# Patient Record
Sex: MALE | Race: WHITE | Employment: OTHER | URBAN - NONMETROPOLITAN AREA
[De-identification: names, ages, dates, MRNs, and addresses within clinical notes are randomized per-mention and may not be internally consistent; named-entity substitution may affect disease eponyms.]

---

## 2017-05-01 DIAGNOSIS — G70.00 OCULAR MYASTHENIA GRAVIS (HCC): ICD-10-CM

## 2017-05-01 RX ORDER — PREDNISONE 1 MG/1
1 TABLET ORAL DAILY
Qty: 30 TABLET | Refills: 3 | Status: SHIPPED | OUTPATIENT
Start: 2017-05-01 | End: 2017-09-05 | Stop reason: SDUPTHER

## 2017-05-31 ENCOUNTER — INITIAL CONSULT (OUTPATIENT)
Dept: PULMONOLOGY | Age: 67
End: 2017-05-31

## 2017-05-31 VITALS
OXYGEN SATURATION: 97 % | DIASTOLIC BLOOD PRESSURE: 78 MMHG | HEIGHT: 70 IN | HEART RATE: 73 BPM | SYSTOLIC BLOOD PRESSURE: 128 MMHG | BODY MASS INDEX: 28.2 KG/M2 | WEIGHT: 197 LBS

## 2017-05-31 DIAGNOSIS — G47.33 OSA (OBSTRUCTIVE SLEEP APNEA): Primary | ICD-10-CM

## 2017-05-31 DIAGNOSIS — J30.89 PERENNIAL ALLERGIC RHINITIS, UNSPECIFIED ALLERGIC RHINITIS TRIGGER: ICD-10-CM

## 2017-05-31 DIAGNOSIS — I25.10 CORONARY ARTERY DISEASE INVOLVING NATIVE CORONARY ARTERY OF NATIVE HEART WITHOUT ANGINA PECTORIS: ICD-10-CM

## 2017-05-31 PROCEDURE — G8427 DOCREV CUR MEDS BY ELIG CLIN: HCPCS | Performed by: INTERNAL MEDICINE

## 2017-05-31 PROCEDURE — 99203 OFFICE O/P NEW LOW 30 MIN: CPT | Performed by: INTERNAL MEDICINE

## 2017-05-31 PROCEDURE — 3017F COLORECTAL CA SCREEN DOC REV: CPT | Performed by: INTERNAL MEDICINE

## 2017-05-31 PROCEDURE — G8598 ASA/ANTIPLAT THER USED: HCPCS | Performed by: INTERNAL MEDICINE

## 2017-05-31 PROCEDURE — G8420 CALC BMI NORM PARAMETERS: HCPCS | Performed by: INTERNAL MEDICINE

## 2017-05-31 PROCEDURE — 1036F TOBACCO NON-USER: CPT | Performed by: INTERNAL MEDICINE

## 2017-05-31 PROCEDURE — 1123F ACP DISCUSS/DSCN MKR DOCD: CPT | Performed by: INTERNAL MEDICINE

## 2017-05-31 PROCEDURE — 4040F PNEUMOC VAC/ADMIN/RCVD: CPT | Performed by: INTERNAL MEDICINE

## 2017-05-31 RX ORDER — FLUTICASONE PROPIONATE 50 MCG
2 SPRAY, SUSPENSION (ML) NASAL DAILY
Qty: 1 BOTTLE | Refills: 6 | Status: SHIPPED | OUTPATIENT
Start: 2017-05-31 | End: 2019-11-06

## 2017-06-08 ENCOUNTER — OFFICE VISIT (OUTPATIENT)
Dept: PHYSICAL MEDICINE AND REHAB | Age: 67
End: 2017-06-08

## 2017-06-08 VITALS
DIASTOLIC BLOOD PRESSURE: 72 MMHG | BODY MASS INDEX: 29.47 KG/M2 | HEIGHT: 69 IN | SYSTOLIC BLOOD PRESSURE: 127 MMHG | HEART RATE: 77 BPM | WEIGHT: 199 LBS

## 2017-06-08 DIAGNOSIS — G70.00 OCULAR MYASTHENIA GRAVIS (HCC): Primary | ICD-10-CM

## 2017-06-08 PROCEDURE — 3017F COLORECTAL CA SCREEN DOC REV: CPT | Performed by: PSYCHIATRY & NEUROLOGY

## 2017-06-08 PROCEDURE — G8427 DOCREV CUR MEDS BY ELIG CLIN: HCPCS | Performed by: PSYCHIATRY & NEUROLOGY

## 2017-06-08 PROCEDURE — G8419 CALC BMI OUT NRM PARAM NOF/U: HCPCS | Performed by: PSYCHIATRY & NEUROLOGY

## 2017-06-08 PROCEDURE — 1123F ACP DISCUSS/DSCN MKR DOCD: CPT | Performed by: PSYCHIATRY & NEUROLOGY

## 2017-06-08 PROCEDURE — 1036F TOBACCO NON-USER: CPT | Performed by: PSYCHIATRY & NEUROLOGY

## 2017-06-08 PROCEDURE — 4040F PNEUMOC VAC/ADMIN/RCVD: CPT | Performed by: PSYCHIATRY & NEUROLOGY

## 2017-06-08 PROCEDURE — G8598 ASA/ANTIPLAT THER USED: HCPCS | Performed by: PSYCHIATRY & NEUROLOGY

## 2017-06-08 PROCEDURE — 99213 OFFICE O/P EST LOW 20 MIN: CPT | Performed by: PSYCHIATRY & NEUROLOGY

## 2017-07-07 ENCOUNTER — TELEPHONE (OUTPATIENT)
Dept: SLEEP CENTER | Age: 67
End: 2017-07-07

## 2017-07-20 ENCOUNTER — TELEPHONE (OUTPATIENT)
Dept: PULMONOLOGY | Age: 67
End: 2017-07-20

## 2017-09-05 DIAGNOSIS — G70.00 OCULAR MYASTHENIA GRAVIS (HCC): ICD-10-CM

## 2017-09-06 RX ORDER — PREDNISONE 1 MG/1
1 TABLET ORAL DAILY
Qty: 30 TABLET | Refills: 3 | Status: SHIPPED | OUTPATIENT
Start: 2017-09-06 | End: 2017-12-11 | Stop reason: SDUPTHER

## 2017-09-13 ENCOUNTER — OFFICE VISIT (OUTPATIENT)
Dept: PULMONOLOGY | Age: 67
End: 2017-09-13
Payer: OTHER GOVERNMENT

## 2017-09-13 VITALS
DIASTOLIC BLOOD PRESSURE: 80 MMHG | HEART RATE: 72 BPM | WEIGHT: 194.2 LBS | SYSTOLIC BLOOD PRESSURE: 136 MMHG | HEIGHT: 70 IN | BODY MASS INDEX: 27.8 KG/M2 | OXYGEN SATURATION: 97 %

## 2017-09-13 DIAGNOSIS — G47.33 OSA ON CPAP: ICD-10-CM

## 2017-09-13 DIAGNOSIS — Z99.89 OSA ON CPAP: ICD-10-CM

## 2017-09-13 PROCEDURE — 99213 OFFICE O/P EST LOW 20 MIN: CPT | Performed by: PHYSICIAN ASSISTANT

## 2017-09-21 ENCOUNTER — TELEPHONE (OUTPATIENT)
Dept: ADMINISTRATIVE | Age: 67
End: 2017-09-21

## 2017-09-21 NOTE — TELEPHONE ENCOUNTER
Telephone Outcome: Other - pt is in [place], is interested in scheduling a Medicare AWV, will call when he returns.

## 2017-11-13 ENCOUNTER — TELEPHONE (OUTPATIENT)
Dept: FAMILY MEDICINE CLINIC | Age: 67
End: 2017-11-13

## 2017-11-13 DIAGNOSIS — I25.10 CORONARY ARTERY DISEASE INVOLVING NATIVE CORONARY ARTERY OF NATIVE HEART WITHOUT ANGINA PECTORIS: Primary | ICD-10-CM

## 2017-11-13 DIAGNOSIS — E78.00 PURE HYPERCHOLESTEROLEMIA: ICD-10-CM

## 2017-11-13 DIAGNOSIS — R79.89 LOW TESTOSTERONE: ICD-10-CM

## 2017-11-13 DIAGNOSIS — N40.0 BENIGN PROSTATIC HYPERPLASIA, UNSPECIFIED WHETHER LOWER URINARY TRACT SYMPTOMS PRESENT: ICD-10-CM

## 2017-11-13 NOTE — TELEPHONE ENCOUNTER
Patient is asking for a order for lab work so he can get it done before he comes in for a December visit.  He would like to stop by and  the order once it is ready please advise to patient at [phone]

## 2017-11-16 ENCOUNTER — TELEPHONE (OUTPATIENT)
Dept: FAMILY MEDICINE CLINIC | Age: 67
End: 2017-11-16

## 2017-11-16 LAB
ABSOLUTE BASO #: 0.1 K/UL (ref 0–0.1)
ABSOLUTE EOS #: 0.2 K/UL (ref 0.1–0.4)
ABSOLUTE LYMPH #: 2.1 K/UL (ref 0.8–5.2)
ABSOLUTE MONO #: 0.8 K/UL (ref 0.1–0.9)
ABSOLUTE NEUT #: 3.8 K/UL (ref 1.3–9.1)
ALBUMIN SERPL-MCNC: 4.3 G/DL (ref 3.2–5.3)
ALK PHOSPHATASE: 56 IU/L (ref 35–121)
ALT SERPL-CCNC: 15 IU/L (ref 5–59)
ANION GAP SERPL CALCULATED.3IONS-SCNC: 15 MMOL/L
AST SERPL-CCNC: 18 IU/L (ref 10–42)
BASOPHILS RELATIVE PERCENT: 1.2 %
BILIRUB SERPL-MCNC: 0.7 MG/DL (ref 0.2–1.3)
BUN BLDV-MCNC: 10 MG/DL (ref 10–20)
CALCIUM SERPL-MCNC: 9.6 MG/DL (ref 8.7–10.8)
CHLORIDE BLD-SCNC: 102 MMOL/L (ref 95–111)
CHOLESTEROL/HDL RATIO: 2.5
CHOLESTEROL: 199 MG/DL
CO2: 30 MMOL/L (ref 21–32)
CREAT SERPL-MCNC: 0.7 MG/DL (ref 0.5–1.3)
EGFR AFRICAN AMERICAN: 135
EGFR IF NONAFRICAN AMERICAN: 112
EOSINOPHILS RELATIVE PERCENT: 2.3 %
GLUCOSE: 99 MG/DL (ref 70–100)
HCT VFR BLD CALC: 49.3 % (ref 41.4–51)
HDLC SERPL-MCNC: 79 MG/DL (ref 40–60)
HEMOGLOBIN: 16.4 G/DL (ref 13.8–17)
LDL CHOLESTEROL CALCULATED: 93 MG/DL
LDL/HDL RATIO: 1.2
LYMPHOCYTE %: 30.3 %
MCH RBC QN AUTO: 29.3 PG (ref 27–34)
MCHC RBC AUTO-ENTMCNC: 33.3 G/DL (ref 31–36)
MCV RBC AUTO: 88.2 FL (ref 80–100)
MONOCYTES # BLD: 11.1 %
NEUTROPHILS RELATIVE PERCENT: 54.2 %
PDW BLD-RTO: 13.1 % (ref 10.8–14.8)
PLATELETS: 229 K/UL (ref 150–450)
POTASSIUM SERPL-SCNC: 4.8 MMOL/L (ref 3.5–5.4)
PSA, ULTRASENSITIVE: 1.62 NG/ML
RBC: 5.59 M/UL (ref 4–5.5)
SODIUM BLD-SCNC: 142 MMOL/L (ref 134–147)
TOTAL PROTEIN: 6.6 G/DL (ref 5.8–8)
TRIGL SERPL-MCNC: 135 MG/DL
VLDLC SERPL CALC-MCNC: 27 MG/DL
WBC: 6.9 K/UL (ref 3.7–10.8)

## 2017-11-16 NOTE — TELEPHONE ENCOUNTER
----- Message from Jasmyne Magdaleno DO sent at 11/16/2017  8:35 AM EST -----  Labs look good, will discuss further at next visit.

## 2017-12-11 ENCOUNTER — OFFICE VISIT (OUTPATIENT)
Dept: NEUROLOGY | Age: 67
End: 2017-12-11
Payer: MEDICARE

## 2017-12-11 VITALS
HEART RATE: 89 BPM | BODY MASS INDEX: 29.47 KG/M2 | HEIGHT: 69 IN | DIASTOLIC BLOOD PRESSURE: 84 MMHG | WEIGHT: 199 LBS | SYSTOLIC BLOOD PRESSURE: 128 MMHG

## 2017-12-11 DIAGNOSIS — G70.00 OCULAR MYASTHENIA GRAVIS (HCC): Primary | ICD-10-CM

## 2017-12-11 PROCEDURE — 4040F PNEUMOC VAC/ADMIN/RCVD: CPT | Performed by: PSYCHIATRY & NEUROLOGY

## 2017-12-11 PROCEDURE — G8482 FLU IMMUNIZE ORDER/ADMIN: HCPCS | Performed by: PSYCHIATRY & NEUROLOGY

## 2017-12-11 PROCEDURE — G8427 DOCREV CUR MEDS BY ELIG CLIN: HCPCS | Performed by: PSYCHIATRY & NEUROLOGY

## 2017-12-11 PROCEDURE — 1123F ACP DISCUSS/DSCN MKR DOCD: CPT | Performed by: PSYCHIATRY & NEUROLOGY

## 2017-12-11 PROCEDURE — 99213 OFFICE O/P EST LOW 20 MIN: CPT | Performed by: PSYCHIATRY & NEUROLOGY

## 2017-12-11 PROCEDURE — 1036F TOBACCO NON-USER: CPT | Performed by: PSYCHIATRY & NEUROLOGY

## 2017-12-11 PROCEDURE — 3017F COLORECTAL CA SCREEN DOC REV: CPT | Performed by: PSYCHIATRY & NEUROLOGY

## 2017-12-11 PROCEDURE — G8419 CALC BMI OUT NRM PARAM NOF/U: HCPCS | Performed by: PSYCHIATRY & NEUROLOGY

## 2017-12-11 PROCEDURE — G8598 ASA/ANTIPLAT THER USED: HCPCS | Performed by: PSYCHIATRY & NEUROLOGY

## 2017-12-11 RX ORDER — PREDNISONE 1 MG/1
1 TABLET ORAL DAILY
Qty: 30 TABLET | Refills: 3 | Status: SHIPPED | OUTPATIENT
Start: 2017-12-11 | End: 2018-05-14 | Stop reason: SDUPTHER

## 2017-12-11 NOTE — PROGRESS NOTES
he is doing well on it. After detailed discussion with patient we agreed on the following plan. Plan:    1. Continue Prednisone to 1 mg daily. Refills given  2. Continue over the counter Pepcid  3. Take Calcium and Vitamin D daily while taking Prednisone  4. Follow up in 6 months or sooner if needed. 5. Call if any questions or concerns. Please call if any questions.      Sharmaine Neil MD

## 2017-12-14 ENCOUNTER — OFFICE VISIT (OUTPATIENT)
Dept: FAMILY MEDICINE CLINIC | Age: 67
End: 2017-12-14
Payer: MEDICARE

## 2017-12-14 VITALS
RESPIRATION RATE: 14 BRPM | HEART RATE: 69 BPM | TEMPERATURE: 98 F | WEIGHT: 198.8 LBS | SYSTOLIC BLOOD PRESSURE: 102 MMHG | HEIGHT: 69 IN | BODY MASS INDEX: 29.44 KG/M2 | DIASTOLIC BLOOD PRESSURE: 76 MMHG

## 2017-12-14 DIAGNOSIS — Z00.00 ROUTINE GENERAL MEDICAL EXAMINATION AT A HEALTH CARE FACILITY: Primary | ICD-10-CM

## 2017-12-14 PROCEDURE — G0439 PPPS, SUBSEQ VISIT: HCPCS | Performed by: FAMILY MEDICINE

## 2017-12-14 PROCEDURE — G8598 ASA/ANTIPLAT THER USED: HCPCS | Performed by: FAMILY MEDICINE

## 2017-12-14 ASSESSMENT — PATIENT HEALTH QUESTIONNAIRE - PHQ9
2. FEELING DOWN, DEPRESSED OR HOPELESS: 0
SUM OF ALL RESPONSES TO PHQ QUESTIONS 1-9: 0
SUM OF ALL RESPONSES TO PHQ9 QUESTIONS 1 & 2: 0
1. LITTLE INTEREST OR PLEASURE IN DOING THINGS: 0

## 2017-12-14 NOTE — PATIENT INSTRUCTIONS
You may receive a survey about your visit with us today. The feedback from our patients helps us identify what is working well and where the service to all patients can be enhanced. Thank you! Personalized Preventive Plan for Irvin Duarte - 12/14/2017  Medicare offers a range of preventive health benefits. Some of the tests and screenings are paid in full while other may be subject to a deductible, co-insurance, and/or copay. Some of these benefits include a comprehensive review of your medical history including lifestyle, illnesses that may run in your family, and various assessments and screenings as appropriate. After reviewing your medical record and screening and assessments performed today your provider may have ordered immunizations, labs, imaging, and/or referrals for you. A list of these orders (if applicable) as well as your Preventive Care list are included within your After Visit Summary for your review. Other Preventive Recommendations:    · A preventive eye exam performed by an eye specialist is recommended every 1-2 years to screen for glaucoma; cataracts, macular degeneration, and other eye disorders. · A preventive dental visit is recommended every 6 months. · Try to get at least 150 minutes of exercise per week or 10,000 steps per day on a pedometer . · Order or download the FREE \"Exercise & Physical Activity: Your Everyday Guide\" from [employer]. Call 9-205.803.3232 or search [employer] online. · You need 6977-0039 mg of calcium and 7242-7855 IU of vitamin D per day. It is possible to meet your calcium requirement with diet alone, but a vitamin D supplement is usually necessary to meet this goal.  · When exposed to the sun, use a sunscreen that protects against both UVA and UVB radiation with an SPF of 30 or greater. Reapply every 2 to 3 hours or after sweating, drying off with a towel, or swimming.   · Always wear a seat belt when

## 2018-05-14 DIAGNOSIS — G70.00 OCULAR MYASTHENIA GRAVIS (HCC): ICD-10-CM

## 2018-05-14 RX ORDER — PREDNISONE 1 MG/1
1 TABLET ORAL DAILY
Qty: 90 TABLET | Refills: 1 | Status: SHIPPED | OUTPATIENT
Start: 2018-05-14 | End: 2018-06-04 | Stop reason: SDUPTHER

## 2018-06-04 ENCOUNTER — OFFICE VISIT (OUTPATIENT)
Dept: NEUROLOGY | Age: 68
End: 2018-06-04
Payer: MEDICARE

## 2018-06-04 VITALS
BODY MASS INDEX: 30.07 KG/M2 | HEART RATE: 68 BPM | HEIGHT: 69 IN | DIASTOLIC BLOOD PRESSURE: 76 MMHG | SYSTOLIC BLOOD PRESSURE: 120 MMHG | WEIGHT: 203 LBS

## 2018-06-04 DIAGNOSIS — G70.00 OCULAR MYASTHENIA GRAVIS (HCC): Primary | ICD-10-CM

## 2018-06-04 PROCEDURE — 4040F PNEUMOC VAC/ADMIN/RCVD: CPT | Performed by: PSYCHIATRY & NEUROLOGY

## 2018-06-04 PROCEDURE — G8417 CALC BMI ABV UP PARAM F/U: HCPCS | Performed by: PSYCHIATRY & NEUROLOGY

## 2018-06-04 PROCEDURE — 3017F COLORECTAL CA SCREEN DOC REV: CPT | Performed by: PSYCHIATRY & NEUROLOGY

## 2018-06-04 PROCEDURE — 1036F TOBACCO NON-USER: CPT | Performed by: PSYCHIATRY & NEUROLOGY

## 2018-06-04 PROCEDURE — G8598 ASA/ANTIPLAT THER USED: HCPCS | Performed by: PSYCHIATRY & NEUROLOGY

## 2018-06-04 PROCEDURE — 99213 OFFICE O/P EST LOW 20 MIN: CPT | Performed by: PSYCHIATRY & NEUROLOGY

## 2018-06-04 PROCEDURE — 1123F ACP DISCUSS/DSCN MKR DOCD: CPT | Performed by: PSYCHIATRY & NEUROLOGY

## 2018-06-04 PROCEDURE — G8427 DOCREV CUR MEDS BY ELIG CLIN: HCPCS | Performed by: PSYCHIATRY & NEUROLOGY

## 2018-06-04 RX ORDER — PREDNISONE 1 MG/1
1 TABLET ORAL DAILY
Qty: 90 TABLET | Refills: 2 | Status: SHIPPED | OUTPATIENT
Start: 2018-06-04 | End: 2018-12-10 | Stop reason: SDUPTHER

## 2018-06-06 ENCOUNTER — TELEPHONE (OUTPATIENT)
Dept: FAMILY MEDICINE CLINIC | Age: 68
End: 2018-06-06

## 2018-12-10 ENCOUNTER — OFFICE VISIT (OUTPATIENT)
Dept: NEUROLOGY | Age: 68
End: 2018-12-10
Payer: MEDICARE

## 2018-12-10 VITALS
WEIGHT: 197 LBS | HEART RATE: 66 BPM | HEIGHT: 69 IN | DIASTOLIC BLOOD PRESSURE: 60 MMHG | BODY MASS INDEX: 29.18 KG/M2 | SYSTOLIC BLOOD PRESSURE: 132 MMHG

## 2018-12-10 DIAGNOSIS — G70.00 OCULAR MYASTHENIA GRAVIS (HCC): Primary | ICD-10-CM

## 2018-12-10 PROCEDURE — 99213 OFFICE O/P EST LOW 20 MIN: CPT | Performed by: PSYCHIATRY & NEUROLOGY

## 2018-12-10 PROCEDURE — 3017F COLORECTAL CA SCREEN DOC REV: CPT | Performed by: PSYCHIATRY & NEUROLOGY

## 2018-12-10 PROCEDURE — 1036F TOBACCO NON-USER: CPT | Performed by: PSYCHIATRY & NEUROLOGY

## 2018-12-10 PROCEDURE — G8417 CALC BMI ABV UP PARAM F/U: HCPCS | Performed by: PSYCHIATRY & NEUROLOGY

## 2018-12-10 PROCEDURE — G8484 FLU IMMUNIZE NO ADMIN: HCPCS | Performed by: PSYCHIATRY & NEUROLOGY

## 2018-12-10 PROCEDURE — 1123F ACP DISCUSS/DSCN MKR DOCD: CPT | Performed by: PSYCHIATRY & NEUROLOGY

## 2018-12-10 PROCEDURE — 4040F PNEUMOC VAC/ADMIN/RCVD: CPT | Performed by: PSYCHIATRY & NEUROLOGY

## 2018-12-10 PROCEDURE — G8427 DOCREV CUR MEDS BY ELIG CLIN: HCPCS | Performed by: PSYCHIATRY & NEUROLOGY

## 2018-12-10 PROCEDURE — 1101F PT FALLS ASSESS-DOCD LE1/YR: CPT | Performed by: PSYCHIATRY & NEUROLOGY

## 2018-12-10 PROCEDURE — G8598 ASA/ANTIPLAT THER USED: HCPCS | Performed by: PSYCHIATRY & NEUROLOGY

## 2018-12-10 RX ORDER — PREDNISONE 1 MG/1
1 TABLET ORAL DAILY
Qty: 90 TABLET | Refills: 2 | Status: SHIPPED | OUTPATIENT
Start: 2018-12-10 | End: 2019-12-10

## 2018-12-10 NOTE — PROGRESS NOTES
D, while on Prednisone. He is taking over the counter Pepcid. He states he is doing well on it. After detailed discussion with patient we agreed on the following plan. Plan:    1. Continue Prednisone to 1 mg daily. Refills given  2. Continue over the counter Pepcid  3. Take Calcium and Vitamin D daily while taking Prednisone  4. Follow up in 6 months or sooner if needed. 5. Call if any questions or concerns. Please call if any questions.      Saurabh Garg MD

## 2018-12-11 ENCOUNTER — TELEPHONE (OUTPATIENT)
Dept: FAMILY MEDICINE CLINIC | Age: 68
End: 2018-12-11

## 2018-12-11 DIAGNOSIS — I25.10 CORONARY ARTERY DISEASE INVOLVING NATIVE CORONARY ARTERY OF NATIVE HEART WITHOUT ANGINA PECTORIS: ICD-10-CM

## 2018-12-11 DIAGNOSIS — N40.0 BENIGN PROSTATIC HYPERPLASIA, UNSPECIFIED WHETHER LOWER URINARY TRACT SYMPTOMS PRESENT: ICD-10-CM

## 2018-12-11 DIAGNOSIS — R79.89 LOW TESTOSTERONE: Primary | ICD-10-CM

## 2018-12-11 DIAGNOSIS — R33.9 URINARY RETENTION: ICD-10-CM

## 2018-12-13 ENCOUNTER — NURSE ONLY (OUTPATIENT)
Dept: LAB | Age: 68
End: 2018-12-13

## 2018-12-13 DIAGNOSIS — I25.10 CORONARY ARTERY DISEASE INVOLVING NATIVE CORONARY ARTERY OF NATIVE HEART WITHOUT ANGINA PECTORIS: ICD-10-CM

## 2018-12-13 DIAGNOSIS — R33.9 URINARY RETENTION: ICD-10-CM

## 2018-12-13 DIAGNOSIS — N40.0 BENIGN PROSTATIC HYPERPLASIA, UNSPECIFIED WHETHER LOWER URINARY TRACT SYMPTOMS PRESENT: ICD-10-CM

## 2018-12-13 DIAGNOSIS — R79.89 LOW TESTOSTERONE: ICD-10-CM

## 2018-12-13 LAB
ALBUMIN SERPL-MCNC: 4.2 G/DL (ref 3.5–5.1)
ALP BLD-CCNC: 59 U/L (ref 38–126)
ALT SERPL-CCNC: 12 U/L (ref 11–66)
ANION GAP SERPL CALCULATED.3IONS-SCNC: 11 MEQ/L (ref 8–16)
AST SERPL-CCNC: 19 U/L (ref 5–40)
BASOPHILS # BLD: 1.1 %
BASOPHILS ABSOLUTE: 0.1 THOU/MM3 (ref 0–0.1)
BILIRUB SERPL-MCNC: 0.7 MG/DL (ref 0.3–1.2)
BUN BLDV-MCNC: 13 MG/DL (ref 7–22)
CALCIUM SERPL-MCNC: 9.6 MG/DL (ref 8.5–10.5)
CHLORIDE BLD-SCNC: 102 MEQ/L (ref 98–111)
CHOLESTEROL, TOTAL: 164 MG/DL (ref 100–199)
CO2: 27 MEQ/L (ref 23–33)
CREAT SERPL-MCNC: 0.7 MG/DL (ref 0.4–1.2)
EOSINOPHIL # BLD: 3 %
EOSINOPHILS ABSOLUTE: 0.2 THOU/MM3 (ref 0–0.4)
ERYTHROCYTE [DISTWIDTH] IN BLOOD BY AUTOMATED COUNT: 13.2 % (ref 11.5–14.5)
ERYTHROCYTE [DISTWIDTH] IN BLOOD BY AUTOMATED COUNT: 43.8 FL (ref 35–45)
GFR SERPL CREATININE-BSD FRML MDRD: > 90 ML/MIN/1.73M2
GLUCOSE BLD-MCNC: 103 MG/DL (ref 70–108)
HCT VFR BLD CALC: 48.5 % (ref 42–52)
HDLC SERPL-MCNC: 62 MG/DL
HEMOGLOBIN: 15.9 GM/DL (ref 14–18)
IMMATURE GRANS (ABS): 0.02 THOU/MM3 (ref 0–0.07)
IMMATURE GRANULOCYTES: 0.4 %
LDL CHOLESTEROL CALCULATED: 80 MG/DL
LYMPHOCYTES # BLD: 31.9 %
LYMPHOCYTES ABSOLUTE: 1.8 THOU/MM3 (ref 1–4.8)
MCH RBC QN AUTO: 29.9 PG (ref 26–33)
MCHC RBC AUTO-ENTMCNC: 32.8 GM/DL (ref 32.2–35.5)
MCV RBC AUTO: 91.3 FL (ref 80–94)
MONOCYTES # BLD: 11.3 %
MONOCYTES ABSOLUTE: 0.6 THOU/MM3 (ref 0.4–1.3)
NUCLEATED RED BLOOD CELLS: 0 /100 WBC
PLATELET # BLD: 233 THOU/MM3 (ref 130–400)
PMV BLD AUTO: 10.2 FL (ref 9.4–12.4)
POTASSIUM SERPL-SCNC: 4.4 MEQ/L (ref 3.5–5.2)
PROSTATE SPECIFIC ANTIGEN: 1.86 NG/ML (ref 0–1)
RBC # BLD: 5.31 MILL/MM3 (ref 4.7–6.1)
SEG NEUTROPHILS: 52.3 %
SEGMENTED NEUTROPHILS ABSOLUTE COUNT: 3 THOU/MM3 (ref 1.8–7.7)
SODIUM BLD-SCNC: 140 MEQ/L (ref 135–145)
TOTAL PROTEIN: 6.9 G/DL (ref 6.1–8)
TRIGL SERPL-MCNC: 112 MG/DL (ref 0–199)
WBC # BLD: 5.7 THOU/MM3 (ref 4.8–10.8)

## 2018-12-26 ENCOUNTER — OFFICE VISIT (OUTPATIENT)
Dept: FAMILY MEDICINE CLINIC | Age: 68
End: 2018-12-26
Payer: MEDICARE

## 2018-12-26 VITALS
HEART RATE: 76 BPM | SYSTOLIC BLOOD PRESSURE: 136 MMHG | RESPIRATION RATE: 12 BRPM | DIASTOLIC BLOOD PRESSURE: 88 MMHG | WEIGHT: 198.8 LBS | HEIGHT: 70 IN | TEMPERATURE: 98 F | BODY MASS INDEX: 28.46 KG/M2

## 2018-12-26 DIAGNOSIS — G70.00 OCULAR MYASTHENIA GRAVIS (HCC): ICD-10-CM

## 2018-12-26 DIAGNOSIS — E78.5 HYPERLIPIDEMIA, UNSPECIFIED HYPERLIPIDEMIA TYPE: ICD-10-CM

## 2018-12-26 DIAGNOSIS — N40.0 BENIGN PROSTATIC HYPERPLASIA, UNSPECIFIED WHETHER LOWER URINARY TRACT SYMPTOMS PRESENT: Primary | ICD-10-CM

## 2018-12-26 PROCEDURE — G8598 ASA/ANTIPLAT THER USED: HCPCS | Performed by: FAMILY MEDICINE

## 2018-12-26 PROCEDURE — 1101F PT FALLS ASSESS-DOCD LE1/YR: CPT | Performed by: FAMILY MEDICINE

## 2018-12-26 PROCEDURE — G8482 FLU IMMUNIZE ORDER/ADMIN: HCPCS | Performed by: FAMILY MEDICINE

## 2018-12-26 PROCEDURE — 3017F COLORECTAL CA SCREEN DOC REV: CPT | Performed by: FAMILY MEDICINE

## 2018-12-26 PROCEDURE — 1036F TOBACCO NON-USER: CPT | Performed by: FAMILY MEDICINE

## 2018-12-26 PROCEDURE — 99214 OFFICE O/P EST MOD 30 MIN: CPT | Performed by: FAMILY MEDICINE

## 2018-12-26 PROCEDURE — 1123F ACP DISCUSS/DSCN MKR DOCD: CPT | Performed by: FAMILY MEDICINE

## 2018-12-26 PROCEDURE — G8417 CALC BMI ABV UP PARAM F/U: HCPCS | Performed by: FAMILY MEDICINE

## 2018-12-26 PROCEDURE — 4040F PNEUMOC VAC/ADMIN/RCVD: CPT | Performed by: FAMILY MEDICINE

## 2018-12-26 PROCEDURE — G8427 DOCREV CUR MEDS BY ELIG CLIN: HCPCS | Performed by: FAMILY MEDICINE

## 2018-12-26 ASSESSMENT — PATIENT HEALTH QUESTIONNAIRE - PHQ9
SUM OF ALL RESPONSES TO PHQ QUESTIONS 1-9: 0
2. FEELING DOWN, DEPRESSED OR HOPELESS: 0
1. LITTLE INTEREST OR PLEASURE IN DOING THINGS: 0
SUM OF ALL RESPONSES TO PHQ9 QUESTIONS 1 & 2: 0
SUM OF ALL RESPONSES TO PHQ QUESTIONS 1-9: 0

## 2018-12-26 NOTE — PROGRESS NOTES
Francisca Leung is a 77 y.o. male that presents for     Chief Complaint   Patient presents with    Annual Exam    Follow Up After Procedure     wants to go over lab results        /88   Pulse 76   Temp 98 °F (36.7 °C) (Oral)   Resp 12   Ht 5' 9.5\" (1.765 m)   Wt 198 lb 12.8 oz (90.2 kg)   BMI 28.94 kg/m²       HPI:    Dyslipidemia    Current Medication regimen - zocor 40  Tolerating medications well? Yes  Personal History of CAD? Yes   Hx of CAD in first degree relatives? No  Current smoker? No  History of HTN? No  History of DM? No    Shortness of breath or chest pain? No  Neurologic changes? No  Extremity edema? No    Lab Results   Component Value Date    LDLCALC 80 12/13/2018     BP Readings from Last 3 Encounters:   12/26/18 136/88   12/10/18 132/60   06/04/18 120/76     Wt Readings from Last 3 Encounters:   12/26/18 198 lb 12.8 oz (90.2 kg)   12/10/18 197 lb (89.4 kg)   06/04/18 203 lb (92.1 kg)     BPH:  Recent PSA wnl. He is on Flomax. Notes a little hesitancy. MG:  Taking prednisone qod. STates that he is trying to wean off of the prednisone. Sx generally have been well controlled.       Health Maintenance   Topic Date Due    AAA screen  1950    Shingles Vaccine (1 of 2 - 2 Dose Series) 07/23/1998    DTaP/Tdap/Td vaccine (1 - Tdap) 01/02/1999    Colon cancer screen colonoscopy  09/29/2021    Diabetes screen  12/13/2021    Lipid screen  12/13/2023    Flu vaccine  Completed    Pneumococcal low/med risk  Completed    Hepatitis C screen  Completed       Past Medical History:   Diagnosis Date    Arthritis     BPH (benign prostatic hyperplasia)     CAD (coronary artery disease)     Cataract     Diverticulitis     Diverticulosis     sigmoid    Glaucoma     History of colonic polyps     History of Helicobacter pylori infection     1990    Internal hemorrhoid     Ocular myasthenia gravis (HCC)     OS    Tattoo     Tinnitus     Unspecified sleep apnea        Past Surgical History:   Procedure Laterality Date    BACK SURGERY      betzaida placement     CATARACT REMOVAL WITH IMPLANT Bilateral 2005    COLONOSCOPY  2013, 2016    EYE SURGERY      MENISCECTOMY      UPPER GASTROINTESTINAL ENDOSCOPY  1990    Unable to obtain       Social History   Substance Use Topics    Smoking status: Former Smoker     Packs/day: 1.00     Years: 15.00     Types: Cigarettes     Quit date: 1/1/1979    Smokeless tobacco: Never Used    Alcohol use 0.0 oz/week      Comment: socially- rarely       Family History   Problem Relation Age of Onset    High Blood Pressure Mother     Diabetes Father     Cancer Sister 72        Mesothelioma    Lupus Sister     Colon Cancer Neg Hx     Inflam Bowel Dis Neg Hx          I have reviewed the patient's past medical history, past surgical history, allergies, medications, social and family history and I have made updates where appropriate.     Review of Systems        PHYSICAL EXAM:  /88   Pulse 76   Temp 98 °F (36.7 °C) (Oral)   Resp 12   Ht 5' 9.5\" (1.765 m)   Wt 198 lb 12.8 oz (90.2 kg)   BMI 28.94 kg/m²     General Appearance: well developed and well- nourished, in no acute distress  Head: normocephalic and atraumatic  ENT: external ear and ear canal normal bilaterally, nose without deformity, nasal mucosa and turbinates normal without polyps, oropharynx normal, dentition is normal for age, no lipor gum lesions noted  Neck: supple and non-tender without mass, no thyromegaly or thyroid nodules, no cervical lymphadenopathy  Pulmonary/Chest: clear to auscultation bilaterally- no wheezes, rales or rhonchi, normal air movement, no respiratory distress or retractions  Cardiovascular: normal rate, regular rhythm, normal S1 and S2, no murmurs, rubs, clicks, or gallops, distal pulses intact  Abdomen: soft, non-tender, non-distended, no rebound or guarding, no masses or hernias noted, no hepatospleenomegaly  Extremities: no cyanosis, clubbing or edema of

## 2019-11-06 ENCOUNTER — HOSPITAL ENCOUNTER (EMERGENCY)
Age: 69
Discharge: HOME | End: 2019-11-06
Attending: FAMILY MEDICINE
Payer: OTHER GOVERNMENT

## 2019-11-06 ENCOUNTER — APPOINTMENT (OUTPATIENT)
Dept: GENERAL RADIOLOGY | Age: 69
End: 2019-11-06
Payer: OTHER GOVERNMENT

## 2019-11-06 ENCOUNTER — APPOINTMENT (OUTPATIENT)
Dept: CT IMAGING | Age: 69
End: 2019-11-06
Payer: OTHER GOVERNMENT

## 2019-11-06 VITALS
SYSTOLIC BLOOD PRESSURE: 134 MMHG | DIASTOLIC BLOOD PRESSURE: 88 MMHG | TEMPERATURE: 98.2 F | OXYGEN SATURATION: 100 % | HEART RATE: 82 BPM | RESPIRATION RATE: 16 BRPM

## 2019-11-06 DIAGNOSIS — M54.2 NECK PAIN: Primary | ICD-10-CM

## 2019-11-06 LAB
ANION GAP SERPL CALCULATED.3IONS-SCNC: 14 MEQ/L (ref 8–16)
BASOPHILS # BLD: 0.6 %
BASOPHILS ABSOLUTE: 0.1 THOU/MM3 (ref 0–0.1)
BUN BLDV-MCNC: 11 MG/DL (ref 7–22)
C-REACTIVE PROTEIN: 3.81 MG/DL (ref 0–1)
CALCIUM SERPL-MCNC: 10 MG/DL (ref 8.5–10.5)
CHLORIDE BLD-SCNC: 102 MEQ/L (ref 98–111)
CO2: 24 MEQ/L (ref 23–33)
CREAT SERPL-MCNC: 0.7 MG/DL (ref 0.4–1.2)
EKG ATRIAL RATE: 87 BPM
EKG P AXIS: 49 DEGREES
EKG P-R INTERVAL: 172 MS
EKG Q-T INTERVAL: 366 MS
EKG QRS DURATION: 98 MS
EKG QTC CALCULATION (BAZETT): 440 MS
EKG R AXIS: 56 DEGREES
EKG T AXIS: 53 DEGREES
EKG VENTRICULAR RATE: 87 BPM
EOSINOPHIL # BLD: 1.1 %
EOSINOPHILS ABSOLUTE: 0.1 THOU/MM3 (ref 0–0.4)
ERYTHROCYTE [DISTWIDTH] IN BLOOD BY AUTOMATED COUNT: 13.3 % (ref 11.5–14.5)
ERYTHROCYTE [DISTWIDTH] IN BLOOD BY AUTOMATED COUNT: 43.3 FL (ref 35–45)
GFR SERPL CREATININE-BSD FRML MDRD: > 90 ML/MIN/1.73M2
GLUCOSE BLD-MCNC: 112 MG/DL (ref 70–108)
HCT VFR BLD CALC: 46.8 % (ref 42–52)
HEMOGLOBIN: 15.5 GM/DL (ref 14–18)
IMMATURE GRANS (ABS): 0.04 THOU/MM3 (ref 0–0.07)
IMMATURE GRANULOCYTES: 0.5 %
LYMPHOCYTES # BLD: 13.4 %
LYMPHOCYTES ABSOLUTE: 1.2 THOU/MM3 (ref 1–4.8)
MCH RBC QN AUTO: 29.6 PG (ref 26–33)
MCHC RBC AUTO-ENTMCNC: 33.1 GM/DL (ref 32.2–35.5)
MCV RBC AUTO: 89.5 FL (ref 80–94)
MONOCYTES # BLD: 9.5 %
MONOCYTES ABSOLUTE: 0.8 THOU/MM3 (ref 0.4–1.3)
NUCLEATED RED BLOOD CELLS: 0 /100 WBC
OSMOLALITY CALCULATION: 279.6 MOSMOL/KG (ref 275–300)
PLATELET # BLD: 243 THOU/MM3 (ref 130–400)
PMV BLD AUTO: 9.9 FL (ref 9.4–12.4)
POTASSIUM REFLEX MAGNESIUM: 4.2 MEQ/L (ref 3.5–5.2)
RBC # BLD: 5.23 MILL/MM3 (ref 4.7–6.1)
SEDIMENTATION RATE, ERYTHROCYTE: 29 MM/HR (ref 0–10)
SEG NEUTROPHILS: 74.9 %
SEGMENTED NEUTROPHILS ABSOLUTE COUNT: 6.6 THOU/MM3 (ref 1.8–7.7)
SODIUM BLD-SCNC: 140 MEQ/L (ref 135–145)
TROPONIN T: < 0.01 NG/ML
WBC # BLD: 8.8 THOU/MM3 (ref 4.8–10.8)

## 2019-11-06 PROCEDURE — 93005 ELECTROCARDIOGRAM TRACING: CPT | Performed by: NURSE PRACTITIONER

## 2019-11-06 PROCEDURE — 6360000002 HC RX W HCPCS: Performed by: NURSE PRACTITIONER

## 2019-11-06 PROCEDURE — 70450 CT HEAD/BRAIN W/O DYE: CPT

## 2019-11-06 PROCEDURE — 86140 C-REACTIVE PROTEIN: CPT

## 2019-11-06 PROCEDURE — 72040 X-RAY EXAM NECK SPINE 2-3 VW: CPT

## 2019-11-06 PROCEDURE — 93010 ELECTROCARDIOGRAM REPORT: CPT | Performed by: NUCLEAR MEDICINE

## 2019-11-06 PROCEDURE — 36415 COLL VENOUS BLD VENIPUNCTURE: CPT

## 2019-11-06 PROCEDURE — 96374 THER/PROPH/DIAG INJ IV PUSH: CPT

## 2019-11-06 PROCEDURE — 85651 RBC SED RATE NONAUTOMATED: CPT

## 2019-11-06 PROCEDURE — 84484 ASSAY OF TROPONIN QUANT: CPT

## 2019-11-06 PROCEDURE — 80048 BASIC METABOLIC PNL TOTAL CA: CPT

## 2019-11-06 PROCEDURE — 2580000003 HC RX 258: Performed by: NURSE PRACTITIONER

## 2019-11-06 PROCEDURE — 85025 COMPLETE CBC W/AUTO DIFF WBC: CPT

## 2019-11-06 PROCEDURE — 99285 EMERGENCY DEPT VISIT HI MDM: CPT

## 2019-11-06 RX ORDER — IBUPROFEN 600 MG/1
600 TABLET ORAL EVERY 6 HOURS PRN
Qty: 30 TABLET | Refills: 0 | Status: SHIPPED | OUTPATIENT
Start: 2019-11-06

## 2019-11-06 RX ORDER — TIZANIDINE HYDROCHLORIDE 4 MG/1
4 CAPSULE, GELATIN COATED ORAL 3 TIMES DAILY PRN
Qty: 24 CAPSULE | Refills: 0 | Status: SHIPPED | OUTPATIENT
Start: 2019-11-06 | End: 2021-07-16

## 2019-11-06 RX ORDER — KETOROLAC TROMETHAMINE 30 MG/ML
15 INJECTION, SOLUTION INTRAMUSCULAR; INTRAVENOUS ONCE
Status: COMPLETED | OUTPATIENT
Start: 2019-11-06 | End: 2019-11-06

## 2019-11-06 RX ORDER — 0.9 % SODIUM CHLORIDE 0.9 %
1000 INTRAVENOUS SOLUTION INTRAVENOUS ONCE
Status: COMPLETED | OUTPATIENT
Start: 2019-11-06 | End: 2019-11-06

## 2019-11-06 RX ADMIN — SODIUM CHLORIDE 1000 ML: 9 INJECTION, SOLUTION INTRAVENOUS at 10:12

## 2019-11-06 RX ADMIN — KETOROLAC TROMETHAMINE 15 MG: 30 INJECTION, SOLUTION INTRAMUSCULAR at 10:12

## 2019-11-06 ASSESSMENT — ENCOUNTER SYMPTOMS
ABDOMINAL PAIN: 0
SINUS PRESSURE: 0
CONSTIPATION: 0
SHORTNESS OF BREATH: 0
ABDOMINAL DISTENTION: 0
TROUBLE SWALLOWING: 0
VOMITING: 0
PHOTOPHOBIA: 0
APNEA: 0
SINUS PAIN: 0
DIARRHEA: 0
BACK PAIN: 1
CHEST TIGHTNESS: 0
SORE THROAT: 0
FACIAL SWELLING: 0
NAUSEA: 0
COUGH: 0
WHEEZING: 0
COLOR CHANGE: 0
RHINORRHEA: 0

## 2019-11-06 ASSESSMENT — PAIN DESCRIPTION - FREQUENCY: FREQUENCY: CONTINUOUS

## 2019-11-06 ASSESSMENT — PAIN SCALES - GENERAL
PAINLEVEL_OUTOF10: 6

## 2019-11-06 ASSESSMENT — PAIN DESCRIPTION - ORIENTATION
ORIENTATION: RIGHT;LEFT;MID
ORIENTATION: RIGHT;LEFT;MID

## 2019-11-06 ASSESSMENT — PAIN DESCRIPTION - PAIN TYPE
TYPE: ACUTE PAIN
TYPE: ACUTE PAIN

## 2019-11-06 ASSESSMENT — PAIN DESCRIPTION - DESCRIPTORS: DESCRIPTORS: ACHING

## 2019-11-06 ASSESSMENT — PAIN DESCRIPTION - LOCATION
LOCATION: NECK;HEAD
LOCATION: HEAD;NECK

## 2019-11-07 ENCOUNTER — CARE COORDINATION (OUTPATIENT)
Dept: CASE MANAGEMENT | Age: 69
End: 2019-11-07

## 2021-07-16 ENCOUNTER — APPOINTMENT (OUTPATIENT)
Dept: GENERAL RADIOLOGY | Age: 71
End: 2021-07-16
Payer: OTHER GOVERNMENT

## 2021-07-16 ENCOUNTER — HOSPITAL ENCOUNTER (EMERGENCY)
Age: 71
Discharge: HOME | End: 2021-07-16
Attending: EMERGENCY MEDICINE
Payer: OTHER GOVERNMENT

## 2021-07-16 VITALS
TEMPERATURE: 96.7 F | WEIGHT: 186 LBS | DIASTOLIC BLOOD PRESSURE: 79 MMHG | OXYGEN SATURATION: 96 % | SYSTOLIC BLOOD PRESSURE: 143 MMHG | BODY MASS INDEX: 27.07 KG/M2 | HEART RATE: 71 BPM | RESPIRATION RATE: 16 BRPM

## 2021-07-16 DIAGNOSIS — S67.02XA CRUSHING INJURY OF LEFT THUMB, INITIAL ENCOUNTER: Primary | ICD-10-CM

## 2021-07-16 DIAGNOSIS — S60.012A CONTUSION OF LEFT THUMB WITHOUT DAMAGE TO NAIL, INITIAL ENCOUNTER: ICD-10-CM

## 2021-07-16 PROCEDURE — 99213 OFFICE O/P EST LOW 20 MIN: CPT | Performed by: EMERGENCY MEDICINE

## 2021-07-16 PROCEDURE — 99213 OFFICE O/P EST LOW 20 MIN: CPT

## 2021-07-16 PROCEDURE — 73140 X-RAY EXAM OF FINGER(S): CPT

## 2021-07-16 RX ORDER — PREDNISONE 1 MG/1
1 TABLET ORAL
COMMUNITY

## 2021-07-16 ASSESSMENT — ENCOUNTER SYMPTOMS
VOICE CHANGE: 0
SINUS PRESSURE: 0
EYE DISCHARGE: 0
WHEEZING: 0
EYE PAIN: 0
SHORTNESS OF BREATH: 0
EYE REDNESS: 0
COUGH: 0
BACK PAIN: 0
DIARRHEA: 0
VOMITING: 0
TROUBLE SWALLOWING: 0
STRIDOR: 0
SORE THROAT: 0
ABDOMINAL PAIN: 0
NAUSEA: 0

## 2021-07-16 ASSESSMENT — PAIN SCALES - GENERAL: PAINLEVEL_OUTOF10: 7

## 2021-07-16 ASSESSMENT — PAIN DESCRIPTION - PAIN TYPE: TYPE: ACUTE PAIN

## 2021-07-16 ASSESSMENT — PAIN DESCRIPTION - LOCATION: LOCATION: FINGER (COMMENT WHICH ONE)

## 2021-07-16 NOTE — ED TRIAGE NOTES
Pt walked to room 8. Pt here with complaints of a left thumb injury. Pt hit his left thumb with an oversize hammer 10 - 14 days ago. Pt thinks that there might be a chipped bone. Still bothering him. Hurts when he bumps it.

## 2021-07-16 NOTE — ED PROVIDER NOTES
[address]       Chief Complaint   Patient presents with    Finger Injury     Pt hit his left thumb with a oversize hammer 2 weeks ago. Pt thinks maybe has a chipped bone. Still has pain when he bumps it. Nurses Notes reviewed and I agree except as noted in the HPI. HISTORY OF PRESENT ILLNESS   Darling Kuhn is a 65 y.o. male who presents 2 weeks after he sustained a crush injury to the left thumb that occurred in his home in [facility]. He accidentally struck his thumb with a hammer. He has persistent pain and tenderness, no deformity, swelling, motor or sensory deficits. Patient denies pain at this time. No laceration or bleeding. Right-hand-dominant. No previous fracture left hand. Quit smoking  REVIEW OF SYSTEMS     Review of Systems   Constitutional: Negative for appetite change, chills, fatigue, fever and unexpected weight change. HENT: Negative for congestion, ear discharge, ear pain, sinus pressure, sneezing, sore throat, trouble swallowing and voice change. Eyes: Negative for pain, discharge and redness. Respiratory: Negative for cough, shortness of breath, wheezing and stridor. Cardiovascular: Negative for chest pain and leg swelling. Gastrointestinal: Negative for abdominal pain, diarrhea, nausea and vomiting. Genitourinary: Negative for dysuria, frequency, hematuria and urgency. Musculoskeletal: Negative for arthralgias, back pain, myalgias and neck pain. Crush injury left thumb with persistent pain   Skin: Negative for rash. Neurological: Negative for dizziness, syncope, weakness and headaches. Hematological: Negative for adenopathy. Psychiatric/Behavioral: Negative for behavioral problems, confusion, sleep disturbance and suicidal ideas. The patient is not nervous/anxious. All other systems reviewed and are negative.       PAST MEDICAL HISTORY         Diagnosis Date    Arthritis     BPH (benign prostatic hyperplasia)     CAD (coronary artery disease)     Cataract     Diverticulitis     Diverticulosis     sigmoid    Glaucoma     History of colonic polyps     History of Helicobacter pylori infection     1990    Internal hemorrhoid     Ocular myasthenia gravis (HCC)     OS    Tattoo     Tinnitus     Unspecified sleep apnea        SURGICAL HISTORY     Patient  has a past surgical history that includes eye surgery; back surgery; meniscectomy; Upper gastrointestinal endoscopy (1990); Cataract removal with implant (Bilateral, 2005); and Colonoscopy (2013, 2016). CURRENT MEDICATIONS       Discharge Medication List as of 7/16/2021 11:57 AM      CONTINUE these medications which have NOT CHANGED    Details   predniSONE (DELTASONE) 1 MG tablet Take 1 mg by mouth Twice a WeekHistorical Med      ibuprofen (ADVIL;MOTRIN) 600 MG tablet Take 1 tablet by mouth every 6 hours as needed for Pain, Disp-30 tablet, R-0Print      Famotidine (PEPCID PO) Take by mouth as neededHistorical Med      Cholecalciferol (VITAMIN D PO) Take by mouthHistorical Med      CPAP Machine MISC Starting Wed 9/13/2017, Disp-1 each, R-0, PrintPlease change CPAP pressure to auto 6-20 cm H20.      fluticasone (FLONASE) 50 MCG/ACT nasal spray 2 sprays by Nasal route daily, Disp-1 Bottle, R-6Normal      Psyllium (METAMUCIL FIBER SINGLES PO) Take 500 mg by mouth 2 times daily       simvastatin (ZOCOR) 40 MG tablet TAKE 1 TABLET BY MOUTH EVERY EVENING, Disp-30 tablet, R-11      tamsulosin (FLOMAX) 0.4 MG capsule Take 0.4 mg by mouth daily. aspirin 81 MG tablet Take 81 mg by mouth daily. ALLERGIES     Patient is is allergic to penicillins. FAMILY HISTORY     Patient'sfamily history includes Cancer (age of onset: 72) in his sister; Diabetes in his father; High Blood Pressure in his mother; Lupus in his sister. SOCIAL HISTORY     Patient  reports that he quit smoking about 42 years ago.  His smoking use included cigarettes. He has a 15.00 pack-year smoking history. He has never used smokeless tobacco. He reports current alcohol use. He reports that he does not use drugs. PHYSICAL EXAM     ED TRIAGE VITALS  BP: (!) 143/79, Temp: 96.7 °F (35.9 °C), Pulse: 71, Resp: 16, SpO2: 96 %  Physical Exam  Vitals and nursing note reviewed. Constitutional:       General: He is not in acute distress. Appearance: He is well-developed. He is not ill-appearing. Comments: Moist membranes, normal airway   HENT:      Head: Normocephalic and atraumatic. Right Ear: External ear normal.      Left Ear: External ear normal.      Nose: Nose normal.      Mouth/Throat:      Pharynx: No oropharyngeal exudate. Comments: Oropharynx normal  Eyes:      General: No scleral icterus. Right eye: No discharge. Left eye: No discharge. Extraocular Movements:      Right eye: Normal extraocular motion. Left eye: Normal extraocular motion. Conjunctiva/sclera: Conjunctivae normal.      Pupils: Pupils are equal, round, and reactive to light. Comments: Conjunctiva clear, orbits atraumatic   Neck:      Thyroid: No thyromegaly. Vascular: No JVD. Cardiovascular:      Rate and Rhythm: Normal rate and regular rhythm. Pulses: Normal pulses. Heart sounds: Normal heart sounds, S1 normal and S2 normal. No murmur heard. No friction rub. No gallop. Pulmonary:      Effort: Pulmonary effort is normal. No tachypnea or respiratory distress. Breath sounds: Normal breath sounds. No stridor. No decreased breath sounds, wheezing, rhonchi or rales. Comments: No cough, chest atraumatic, lungs clear  Chest:      Chest wall: No tenderness. Abdominal:      General: Bowel sounds are normal. There is no distension. Palpations: Abdomen is soft. There is no mass. Tenderness: There is no abdominal tenderness. There is no right CVA tenderness, left CVA tenderness, guarding or rebound. Comments: Soft nontender   Musculoskeletal:         General: Normal range of motion. Right hand: Normal.      Left hand: Tenderness and bony tenderness present. Cervical back: Normal range of motion. No spinous process tenderness or muscular tenderness. Comments: Tenderness over proximal phalanx left thumb. No deformity. Distal neurovascular intact. No infection. Lymphadenopathy:      Cervical: No cervical adenopathy. Right cervical: No superficial or deep cervical adenopathy. Left cervical: No superficial or deep cervical adenopathy. Skin:     General: Skin is warm and dry. Findings: No erythema or rash. Comments: No rash or bruising   Neurological:      Mental Status: He is alert and oriented to person, place, and time. Cranial Nerves: No cranial nerve deficit. Motor: No abnormal muscle tone. Coordination: Coordination normal.      Deep Tendon Reflexes: Reflexes are normal and symmetric. Reflexes normal.      Comments: Appropriate, distal neurovascular intact left upper extremity   Psychiatric:         Behavior: Behavior normal.         Thought Content: Thought content normal.         Judgment: Judgment normal.         DIAGNOSTIC RESULTS   Labs: No results found for this visit on 07/16/21. IMAGING:  XR FINGER LEFT (MIN 2 VIEWS)   Final Result       1. No evidence of acute osseous injury of the thumb. 2. Moderate degenerative changes of the first ray. 3. Metallic foreign body in the thenar webspace. **This report has been created using voice recognition software. It may contain minor errors which are inherent in voice recognition technology. **      Final report electronically signed by Dr. Charles Bradford MD on 7/16/2021 11:40 AM        URGENT CARE COURSE:     Vitals:    07/16/21 1119   BP: (!) 143/79   Pulse: 71   Resp: 16   Temp: 96.7 °F (35.9 °C)   TempSrc: Temporal   SpO2: 96%   Weight: 186 lb (84.4 kg)       Medications - No data

## 2022-07-30 ENCOUNTER — TELEPHONE ENCOUNTER (OUTPATIENT)
Age: 72
End: 2022-07-30

## 2022-07-30 RX ORDER — METRONIDAZOLE 375 MG/1
1 CAPSULE ORAL
Qty: 0 | Refills: 2 | OUTPATIENT
Start: 2013-07-19 | End: 2013-07-29

## 2022-07-30 RX ORDER — CIPROFLOXACIN HCL 500 MG
1 (ONE) TABLET ORAL
Qty: 0 | Refills: 3 | OUTPATIENT
Start: 2013-06-23 | End: 2013-07-19

## 2022-07-30 RX ORDER — METRONIDAZOLE 375 MG/1
1 CAPSULE ORAL
Qty: 0 | Refills: 2 | OUTPATIENT
Start: 2013-06-23 | End: 2013-07-03

## 2022-07-31 ENCOUNTER — TELEPHONE ENCOUNTER (OUTPATIENT)
Age: 72
End: 2022-07-31

## 2022-07-31 RX ORDER — METRONIDAZOLE 375 MG/1
1 CAPSULE ORAL
Qty: 0 | Refills: 2 | Status: ACTIVE | COMMUNITY
Start: 2013-06-23

## 2022-07-31 RX ORDER — CIPROFLOXACIN HCL 500 MG
1 (ONE) TABLET ORAL
Qty: 0 | Refills: 3 | Status: ACTIVE | COMMUNITY
Start: 2013-06-23

## 2022-07-31 RX ORDER — METRONIDAZOLE 375 MG/1
1 CAPSULE ORAL
Qty: 0 | Refills: 2 | Status: ACTIVE | COMMUNITY
Start: 2013-07-19

## 2023-04-24 ENCOUNTER — APPOINTMENT (OUTPATIENT)
Dept: CT IMAGING | Age: 73
End: 2023-04-24
Payer: OTHER GOVERNMENT

## 2023-04-24 ENCOUNTER — HOSPITAL ENCOUNTER (OUTPATIENT)
Age: 73
Setting detail: OBSERVATION
Discharge: HOME | End: 2023-04-25
Attending: EMERGENCY MEDICINE
Payer: OTHER GOVERNMENT

## 2023-04-24 DIAGNOSIS — R31.0 GROSS HEMATURIA: Primary | ICD-10-CM

## 2023-04-24 DIAGNOSIS — R33.9 URINARY RETENTION: ICD-10-CM

## 2023-04-24 PROBLEM — R31.9 HEMATURIA: Status: ACTIVE | Noted: 2023-04-24

## 2023-04-24 LAB
ANION GAP SERPL CALC-SCNC: 11 MEQ/L (ref 8–16)
APTT PPP: 29.7 SECONDS (ref 22–38)
BACTERIA URNS QL MICRO: ABNORMAL /HPF
BASOPHILS ABSOLUTE: 0.1 THOU/MM3 (ref 0–0.1)
BASOPHILS NFR BLD AUTO: 1.3 %
BILIRUB UR QL STRIP.AUTO: NEGATIVE
BUN SERPL-MCNC: 14 MG/DL (ref 7–22)
CALCIUM SERPL-MCNC: 9.7 MG/DL (ref 8.5–10.5)
CASTS #/AREA URNS LPF: ABNORMAL /LPF
CASTS 2: ABNORMAL /LPF
CHARACTER UR: ABNORMAL
CHLORIDE SERPL-SCNC: 105 MEQ/L (ref 98–111)
CO2 SERPL-SCNC: 24 MEQ/L (ref 23–33)
COLOR: ABNORMAL
CREAT SERPL-MCNC: 0.7 MG/DL (ref 0.4–1.2)
CRYSTALS URNS MICRO: ABNORMAL
DEPRECATED RDW RBC AUTO: 45 FL (ref 35–45)
EOSINOPHIL NFR BLD AUTO: 2 %
EOSINOPHILS ABSOLUTE: 0.1 THOU/MM3 (ref 0–0.4)
EPITHELIAL CELLS, UA: ABNORMAL /HPF
ERYTHROCYTE [DISTWIDTH] IN BLOOD BY AUTOMATED COUNT: 13.6 % (ref 11.5–14.5)
GFR SERPL CREATININE-BSD FRML MDRD: > 60 ML/MIN/1.73M2
GLUCOSE SERPL-MCNC: 122 MG/DL (ref 70–108)
GLUCOSE UR QL STRIP.AUTO: NEGATIVE MG/DL
HCT VFR BLD AUTO: 49.3 % (ref 42–52)
HGB BLD-MCNC: 15.8 GM/DL (ref 14–18)
HGB UR QL STRIP.AUTO: ABNORMAL
IMM GRANULOCYTES # BLD AUTO: 0.03 THOU/MM3 (ref 0–0.07)
IMM GRANULOCYTES NFR BLD AUTO: 0.6 %
INR PPP: 0.97 (ref 0.85–1.13)
KETONES UR QL STRIP.AUTO: NEGATIVE
LYMPHOCYTES ABSOLUTE: 1.6 THOU/MM3 (ref 1–4.8)
LYMPHOCYTES NFR BLD AUTO: 29.4 %
MAGNESIUM SERPL-MCNC: 2 MG/DL (ref 1.6–2.4)
MCH RBC QN AUTO: 29 PG (ref 26–33)
MCHC RBC AUTO-ENTMCNC: 32 GM/DL (ref 32.2–35.5)
MCV RBC AUTO: 90.6 FL (ref 80–94)
MISCELLANEOUS 2: ABNORMAL
MONOCYTES ABSOLUTE: 0.5 THOU/MM3 (ref 0.4–1.3)
MONOCYTES NFR BLD AUTO: 9.4 %
NEUTROPHILS NFR BLD AUTO: 57.3 %
NITRITE UR QL STRIP: NEGATIVE
NRBC BLD AUTO-RTO: 0 /100 WBC
OSMOLALITY SERPL CALC.SUM OF ELEC: 281.2 MOSMOL/KG (ref 275–300)
PH UR STRIP.AUTO: 6.5 [PH] (ref 5–9)
PLATELET # BLD AUTO: 204 THOU/MM3 (ref 130–400)
PMV BLD AUTO: 10.1 FL (ref 9.4–12.4)
POTASSIUM SERPL-SCNC: 4.2 MEQ/L (ref 3.5–5.2)
PROT UR STRIP.AUTO-MCNC: 100 MG/DL
RBC # BLD AUTO: 5.44 MILL/MM3 (ref 4.7–6.1)
RBC URINE: > 200 /HPF
RENAL EPI CELLS #/AREA URNS HPF: ABNORMAL /[HPF]
SEGMENTED NEUTROPHILS ABSOLUTE COUNT: 3.1 THOU/MM3 (ref 1.8–7.7)
SODIUM SERPL-SCNC: 140 MEQ/L (ref 135–145)
SP GR UR REFRACT.AUTO: 1.01 (ref 1–1.03)
UROBILINOGEN, URINE: 1 EU/DL (ref 0–1)
WBC # BLD AUTO: 5.4 THOU/MM3 (ref 4.8–10.8)
WBC #/AREA URNS HPF: ABNORMAL /HPF
WBC #/AREA URNS HPF: ABNORMAL /[HPF]
YEAST LIKE FUNGI URNS QL MICRO: ABNORMAL

## 2023-04-24 PROCEDURE — 74178 CT ABD&PLV WO CNTR FLWD CNTR: CPT | Performed by: UROLOGY

## 2023-04-24 PROCEDURE — 99254 IP/OBS CNSLTJ NEW/EST MOD 60: CPT | Performed by: UROLOGY

## 2023-04-24 PROCEDURE — 99221 1ST HOSP IP/OBS SF/LOW 40: CPT | Performed by: NURSE PRACTITIONER

## 2023-04-24 PROCEDURE — 85730 THROMBOPLASTIN TIME PARTIAL: CPT

## 2023-04-24 PROCEDURE — G0378 HOSPITAL OBSERVATION PER HR: HCPCS

## 2023-04-24 PROCEDURE — 51798 US URINE CAPACITY MEASURE: CPT

## 2023-04-24 PROCEDURE — 51702 INSERT TEMP BLADDER CATH: CPT

## 2023-04-24 PROCEDURE — 99285 EMERGENCY DEPT VISIT HI MDM: CPT

## 2023-04-24 PROCEDURE — 6370000000 HC RX 637 (ALT 250 FOR IP): Performed by: NURSE PRACTITIONER

## 2023-04-24 PROCEDURE — 81001 URINALYSIS AUTO W/SCOPE: CPT

## 2023-04-24 PROCEDURE — 83735 ASSAY OF MAGNESIUM: CPT

## 2023-04-24 PROCEDURE — 85610 PROTHROMBIN TIME: CPT

## 2023-04-24 PROCEDURE — 80048 BASIC METABOLIC PNL TOTAL CA: CPT

## 2023-04-24 PROCEDURE — 6360000004 HC RX CONTRAST MEDICATION: Performed by: UROLOGY

## 2023-04-24 PROCEDURE — 36415 COLL VENOUS BLD VENIPUNCTURE: CPT

## 2023-04-24 PROCEDURE — 85025 COMPLETE CBC W/AUTO DIFF WBC: CPT

## 2023-04-24 RX ORDER — TAMSULOSIN HYDROCHLORIDE 0.4 MG/1
0.4 CAPSULE ORAL DAILY
Status: DISCONTINUED | OUTPATIENT
Start: 2023-04-24 | End: 2023-04-25 | Stop reason: HOSPADM

## 2023-04-24 RX ORDER — SODIUM CHLORIDE 0.9 % (FLUSH) 0.9 %
5-40 SYRINGE (ML) INJECTION PRN
Status: DISCONTINUED | OUTPATIENT
Start: 2023-04-24 | End: 2023-04-25 | Stop reason: HOSPADM

## 2023-04-24 RX ORDER — POLYETHYLENE GLYCOL 3350 17 G/17G
17 POWDER, FOR SOLUTION ORAL DAILY PRN
Status: DISCONTINUED | OUTPATIENT
Start: 2023-04-24 | End: 2023-04-25 | Stop reason: HOSPADM

## 2023-04-24 RX ORDER — ONDANSETRON 2 MG/ML
4 INJECTION INTRAMUSCULAR; INTRAVENOUS EVERY 6 HOURS PRN
Status: DISCONTINUED | OUTPATIENT
Start: 2023-04-24 | End: 2023-04-25 | Stop reason: HOSPADM

## 2023-04-24 RX ORDER — ACETAMINOPHEN 325 MG/1
650 TABLET ORAL EVERY 6 HOURS PRN
Status: DISCONTINUED | OUTPATIENT
Start: 2023-04-24 | End: 2023-04-25 | Stop reason: HOSPADM

## 2023-04-24 RX ORDER — POTASSIUM CHLORIDE 20 MEQ/1
40 TABLET, EXTENDED RELEASE ORAL PRN
Status: DISCONTINUED | OUTPATIENT
Start: 2023-04-24 | End: 2023-04-25 | Stop reason: HOSPADM

## 2023-04-24 RX ORDER — LIDOCAINE HYDROCHLORIDE 20 MG/ML
JELLY TOPICAL PRN
Status: DISCONTINUED | OUTPATIENT
Start: 2023-04-24 | End: 2023-04-25 | Stop reason: HOSPADM

## 2023-04-24 RX ORDER — ASPIRIN 81 MG/1
81 TABLET ORAL DAILY
Status: DISCONTINUED | OUTPATIENT
Start: 2023-04-24 | End: 2023-04-25 | Stop reason: HOSPADM

## 2023-04-24 RX ORDER — POTASSIUM CHLORIDE 7.45 MG/ML
10 INJECTION INTRAVENOUS PRN
Status: DISCONTINUED | OUTPATIENT
Start: 2023-04-24 | End: 2023-04-25 | Stop reason: HOSPADM

## 2023-04-24 RX ORDER — ATORVASTATIN CALCIUM 20 MG/1
20 TABLET, FILM COATED ORAL DAILY
Status: DISCONTINUED | OUTPATIENT
Start: 2023-04-24 | End: 2023-04-25 | Stop reason: HOSPADM

## 2023-04-24 RX ORDER — MAGNESIUM SULFATE IN WATER 40 MG/ML
2000 INJECTION, SOLUTION INTRAVENOUS PRN
Status: DISCONTINUED | OUTPATIENT
Start: 2023-04-24 | End: 2023-04-25 | Stop reason: HOSPADM

## 2023-04-24 RX ORDER — SODIUM CHLORIDE 0.9 % (FLUSH) 0.9 %
5-40 SYRINGE (ML) INJECTION EVERY 12 HOURS SCHEDULED
Status: DISCONTINUED | OUTPATIENT
Start: 2023-04-24 | End: 2023-04-25 | Stop reason: HOSPADM

## 2023-04-24 RX ORDER — SODIUM CHLORIDE 9 MG/ML
INJECTION, SOLUTION INTRAVENOUS PRN
Status: DISCONTINUED | OUTPATIENT
Start: 2023-04-24 | End: 2023-04-25 | Stop reason: HOSPADM

## 2023-04-24 RX ORDER — ACETAMINOPHEN 650 MG/1
650 SUPPOSITORY RECTAL EVERY 6 HOURS PRN
Status: DISCONTINUED | OUTPATIENT
Start: 2023-04-24 | End: 2023-04-25 | Stop reason: HOSPADM

## 2023-04-24 RX ORDER — ONDANSETRON 4 MG/1
4 TABLET, ORALLY DISINTEGRATING ORAL EVERY 8 HOURS PRN
Status: DISCONTINUED | OUTPATIENT
Start: 2023-04-24 | End: 2023-04-25 | Stop reason: HOSPADM

## 2023-04-24 RX ADMIN — ATORVASTATIN CALCIUM 20 MG: 20 TABLET, FILM COATED ORAL at 21:24

## 2023-04-24 RX ADMIN — IOPAMIDOL 80 ML: 755 INJECTION, SOLUTION INTRAVENOUS at 15:51

## 2023-04-24 RX ADMIN — TAMSULOSIN HYDROCHLORIDE 0.4 MG: 0.4 CAPSULE ORAL at 21:24

## 2023-04-24 ASSESSMENT — PAIN SCALES - GENERAL
PAINLEVEL_OUTOF10: 0

## 2023-04-24 ASSESSMENT — PAIN - FUNCTIONAL ASSESSMENT: PAIN_FUNCTIONAL_ASSESSMENT: NONE - DENIES PAIN

## 2023-04-24 NOTE — ED PROVIDER NOTES
[facility] EMERGENCY DEPARTMENT    EMERGENCY MEDICINE     Pt Name: Maggie Thorne  MRN: [id number]  [place] 1950  Date of evaluation: 4/24/2023  Treating Resident Physician: Felix Mckeon MD  Supervising Physician: Gayathri Del Toro MD    CHIEF COMPLAINT       Chief Complaint   Patient presents with    Hematuria     History obtained from chart review and the patient. HISTORY OF PRESENT ILLNESS    HPI    Maggie Thorne is a 74 y.o. male with a past medical history significant for BPH, coronary artery disease, glaucoma, struct of sleep apnea on CPAP, on aspirin, , presents to the emergency department for evaluation of hematuria. Patient presents with hematuria that started 2 days ago, has noticed some clots when urinating, still able to urinate without any dysuria, has already urinated twice since his arrival to the emergency department. Denies any fevers or chills, does take aspirin, however no other anticoagulation, no prior episodes of hematuria    The patient has no other acute complaints at this time.     REVIEW OF SYSTEMS   Review of Systems  Negative unless documented in HPI    PAST MEDICAL AND SURGICAL HISTORY     Past Medical History:   Diagnosis Date    Arthritis     BPH (benign prostatic hyperplasia)     CAD (coronary artery disease)     Cataract     Diverticulitis     Diverticulosis     sigmoid    Glaucoma     History of colonic polyps     History of Helicobacter pylori infection     1990    Internal hemorrhoid     Ocular myasthenia gravis ([facility] [address]     OS    Tattoo     Tinnitus     Unspecified sleep apnea        Past Surgical History:   Procedure Laterality Date    BACK SURGERY      betzaida placement     CATARACT REMOVAL WITH IMPLANT Bilateral 2005    COLONOSCOPY  2013, 2016    [facility]    Unable to obtain       CURRENT MEDICATIONS     Current Discharge Medication List        CONTINUE these medications which have NOT CHANGED    Details

## 2023-04-24 NOTE — ED NOTES
Patient to ED for hematuria. Patient noticed  blood in urine two days ago with small clots. Patient has hx of BPH with no surgeries. Patient denies pain or increased pressure in bladder      Oj Mccullough RN  04/24/23 8150

## 2023-04-24 NOTE — ED PROVIDER NOTES

## 2023-04-24 NOTE — PLAN OF CARE
Problem: Discharge Planning  Goal: Discharge to home or other facility with appropriate resources  Outcome: Progressing  Flowsheets (Taken 4/24/2023 1350)  Discharge to home or other facility with appropriate resources:   Identify barriers to discharge with patient and caregiver   Arrange for needed discharge resources and transportation as appropriate   Identify discharge learning needs (meds, wound care, etc)   Arrange for interpreters to assist at discharge as needed   Refer to discharge planning if patient needs post-hospital services based on physician order or complex needs related to functional status, cognitive ability or social support system     Problem: Safety - Adult  Goal: Free from fall injury  Outcome: Progressing  Flowsheets (Taken 4/24/2023 1350)  Free From Fall Injury:   Instruct family/caregiver on patient safety   Based on caregiver fall risk screen, instruct family/caregiver to ask for assistance with transferring infant if caregiver noted to have fall risk factors     Problem: Neurosensory - Adult  Goal: Achieves maximal functionality and self care  Outcome: Progressing  Flowsheets (Taken 4/24/2023 1350)  Achieves maximal functionality and self care:   Monitor swallowing and airway patency with patient fatigue and changes in neurological status   Encourage and assist patient to increase activity and self care with guidance from physical therapy/occupational therapy   Encourage visually impaired, hearing impaired and aphasic patients to use assistive/communication devices     Problem: Cardiovascular - Adult  Goal: Maintains optimal cardiac output and hemodynamic stability  Outcome: Progressing  Flowsheets (Taken 4/24/2023 1350)  Maintains optimal cardiac output and hemodynamic stability:   Monitor blood pressure and heart rate   Monitor urine output and notify Licensed Independent Practitioner for values outside of normal range   Assess for signs of decreased cardiac output   Administer fluid

## 2023-04-24 NOTE — ED NOTES
ED to inpatient nurses report    Chief Complaint   Patient presents with    Hematuria      Present to ED from home  LOC: alert and orientated to name, place, date  Vital signs   Vitals:    04/24/23 0850 04/24/23 0853 04/24/23 1015   BP:  (!) 168/102 118/61   Pulse:  87 81   Resp:  20 20   Temp:  98.4 °F (36.9 °C)    TempSrc:  Oral    SpO2:  97% 97%   Weight: 195 lb (88.5 kg)     Height: 5' 9\" (1.753 m)        Oxygen Baseline RA    Current needs required RA Bipap/Cpap No  LDAs:    Mobility: Requires assistance * 2  Pending ED orders: NA  Present condition: STABLE    C-SSRS Risk of Suicide: No Risk  Swallow Screening    Preferred Language: [place]     Electronically signed by Julio Street, RN on 4/24/2023 at 11:25 AM      Kimberly Mccullough RN  04/24/23 1124

## 2023-04-24 NOTE — H&P
Hospitalist History & Physical    Patient:  Dianah Carrel    Unit/Bed:07/007A  YOB: 1950  MRN: [id number]   Acct: [de-identified]   PCP: No primary care provider on file. Code Status: No Order    Date of Service: Pt seen/examined on 04/24/23 and admitted to Observation with expected LOS less than two midnights due to medical therapy. Chief Complaint: hematuria    Assessment/Plan:  Gross Hematuria  -PVR on arrival 200 mL  -CBI initiated in ED, currently clamped  -urology consult, CTU planned  -cont Flomax  -hold ASA, no chemical DVT prophylaxis  -Hgb stable    Other significant medical history:  BPH  CAD  -continue statin  -hold ASA  Glaucoma  ELINOR  -CPAP at HS    DVT prophylaxis: SCDs only  Code status: Total support    History of Present Illness:  Dianah Carrel is a 74 y.o. male with PMHx that includes: BPH, CAD, glaucoma, ocular myasthenia gravis, ELINOR who presented to [facility] with chief complaint of gross hematuria. Patient is alert, oriented x 3. NAD. States yesterday, around lunch time, he urinated. Due to BPH, he does have difficulty with complete emptying. He felt he needed to urinate further so waited a moment and \"pushed\", states he did indeed urinate more and felt relief however noticed a clot. Patient reports continued hematuria hence presentation to ED for further evaluation. No h/o urology consultation, BPH managed with Flomax per PCP. Denies injury/trauma, abdominal pain or pressure. Review of Systems: Pertinent positives as noted in the HPI. All other systems reviewed and negative.     Past Medical History:        Diagnosis Date    Arthritis     BPH (benign prostatic hyperplasia)     CAD (coronary artery disease)     Cataract     Diverticulitis     Diverticulosis     sigmoid    Glaucoma     History of colonic polyps     History of Helicobacter pylori infection     1990    Internal hemorrhoid     Ocular myasthenia gravis ([facility] [address]     OS    Tattoo     Tinnitus     Unspecified

## 2023-04-24 NOTE — CONSULTS
AM     IMPRESSION/Plan:    Hold anticoagulants  CTU to evaluate   CBI clamped - call if hematuria returns    Gross hematuria - clamp cbi, no infection in UA  BPH - on Flomax, managed by primary    Call for worsening hematuria    Thank you for including us in the care of [address], APRN - DEEPTHI, KEVIN  04/24/23 1:16 PM  Urology

## 2023-04-25 ENCOUNTER — TELEPHONE (OUTPATIENT)
Dept: UROLOGY | Age: 73
End: 2023-04-25

## 2023-04-25 VITALS
SYSTOLIC BLOOD PRESSURE: 144 MMHG | HEIGHT: 69 IN | HEART RATE: 94 BPM | TEMPERATURE: 98.3 F | DIASTOLIC BLOOD PRESSURE: 77 MMHG | OXYGEN SATURATION: 97 % | WEIGHT: 194.5 LBS | BODY MASS INDEX: 28.81 KG/M2 | RESPIRATION RATE: 18 BRPM

## 2023-04-25 LAB
ANION GAP SERPL CALC-SCNC: 12 MEQ/L (ref 8–16)
BASOPHILS ABSOLUTE: 0 THOU/MM3 (ref 0–0.1)
BASOPHILS NFR BLD AUTO: 0.6 %
BUN SERPL-MCNC: 12 MG/DL (ref 7–22)
CALCIUM SERPL-MCNC: 9.6 MG/DL (ref 8.5–10.5)
CHLORIDE SERPL-SCNC: 104 MEQ/L (ref 98–111)
CO2 SERPL-SCNC: 26 MEQ/L (ref 23–33)
CREAT SERPL-MCNC: 0.8 MG/DL (ref 0.4–1.2)
DEPRECATED RDW RBC AUTO: 45.7 FL (ref 35–45)
EOSINOPHIL NFR BLD AUTO: 2.1 %
EOSINOPHILS ABSOLUTE: 0.2 THOU/MM3 (ref 0–0.4)
ERYTHROCYTE [DISTWIDTH] IN BLOOD BY AUTOMATED COUNT: 13.9 % (ref 11.5–14.5)
GFR SERPL CREATININE-BSD FRML MDRD: > 60 ML/MIN/1.73M2
GLUCOSE SERPL-MCNC: 134 MG/DL (ref 70–108)
HCT VFR BLD AUTO: 49.1 % (ref 42–52)
HGB BLD-MCNC: 15.8 GM/DL (ref 14–18)
IMM GRANULOCYTES # BLD AUTO: 0.02 THOU/MM3 (ref 0–0.07)
IMM GRANULOCYTES NFR BLD AUTO: 0.3 %
LYMPHOCYTES ABSOLUTE: 1.7 THOU/MM3 (ref 1–4.8)
LYMPHOCYTES NFR BLD AUTO: 22 %
MCH RBC QN AUTO: 29.1 PG (ref 26–33)
MCHC RBC AUTO-ENTMCNC: 32.2 GM/DL (ref 32.2–35.5)
MCV RBC AUTO: 90.4 FL (ref 80–94)
MONOCYTES ABSOLUTE: 0.8 THOU/MM3 (ref 0.4–1.3)
MONOCYTES NFR BLD AUTO: 10.9 %
NEUTROPHILS NFR BLD AUTO: 64.1 %
NRBC BLD AUTO-RTO: 0 /100 WBC
PLATELET # BLD AUTO: 197 THOU/MM3 (ref 130–400)
PMV BLD AUTO: 10.4 FL (ref 9.4–12.4)
POTASSIUM SERPL-SCNC: 4.8 MEQ/L (ref 3.5–5.2)
PSA SERPL-MCNC: 2.01 NG/ML (ref 0–1)
RBC # BLD AUTO: 5.43 MILL/MM3 (ref 4.7–6.1)
SEGMENTED NEUTROPHILS ABSOLUTE COUNT: 4.9 THOU/MM3 (ref 1.8–7.7)
SODIUM SERPL-SCNC: 142 MEQ/L (ref 135–145)
WBC # BLD AUTO: 7.7 THOU/MM3 (ref 4.8–10.8)

## 2023-04-25 PROCEDURE — 36415 COLL VENOUS BLD VENIPUNCTURE: CPT

## 2023-04-25 PROCEDURE — 80048 BASIC METABOLIC PNL TOTAL CA: CPT

## 2023-04-25 PROCEDURE — G0378 HOSPITAL OBSERVATION PER HR: HCPCS

## 2023-04-25 PROCEDURE — 2580000003 HC RX 258: Performed by: NURSE PRACTITIONER

## 2023-04-25 PROCEDURE — 84153 ASSAY OF PSA TOTAL: CPT

## 2023-04-25 PROCEDURE — 99238 HOSP IP/OBS DSCHRG MGMT 30/<: CPT | Performed by: NURSE PRACTITIONER

## 2023-04-25 PROCEDURE — 99232 SBSQ HOSP IP/OBS MODERATE 35: CPT | Performed by: UROLOGY

## 2023-04-25 PROCEDURE — 85025 COMPLETE CBC W/AUTO DIFF WBC: CPT

## 2023-04-25 RX ADMIN — SODIUM CHLORIDE, PRESERVATIVE FREE 10 ML: 5 INJECTION INTRAVENOUS at 08:29

## 2023-04-25 ASSESSMENT — PAIN SCALES - GENERAL
PAINLEVEL_OUTOF10: 0
PAINLEVEL_OUTOF10: 0

## 2023-04-25 NOTE — PLAN OF CARE
Problem: Discharge Planning  Goal: Discharge to home or other facility with appropriate resources  4/24/2023 2224 by Herman Briseno RN  Outcome: Progressing  Flowsheets  Taken 4/24/2023 2037 by Herman Briseno RN  Discharge to home or other facility with appropriate resources:   Identify barriers to discharge with patient and caregiver   Arrange for needed discharge resources and transportation as appropriate   Identify discharge learning needs (meds, wound care, etc)   Refer to discharge planning if patient needs post-hospital services based on physician order or complex needs related to functional status, cognitive ability or social support system  Taken 4/24/2023 1350 by Aggie Leahy RN  Discharge to home or other facility with appropriate resources:   Identify barriers to discharge with patient and caregiver   Arrange for needed discharge resources and transportation as appropriate   Identify discharge learning needs (meds, wound care, etc)   Arrange for interpreters to assist at discharge as needed   Refer to discharge planning if patient needs post-hospital services based on physician order or complex needs related to functional status, cognitive ability or social support system  4/24/2023 1350 by Aggie Leahy RN  Outcome: Progressing  Flowsheets (Taken 4/24/2023 1350)  Discharge to home or other facility with appropriate resources:   Identify barriers to discharge with patient and caregiver   Arrange for needed discharge resources and transportation as appropriate   Identify discharge learning needs (meds, wound care, etc)   Arrange for interpreters to assist at discharge as needed   Refer to discharge planning if patient needs post-hospital services based on physician order or complex needs related to functional status, cognitive ability or social support system     Problem: Safety - Adult  Goal: Free from fall injury  4/24/2023 2224 by Herman Briseno RN  Outcome: Progressing  Flowsheets (Taken

## 2023-04-25 NOTE — PLAN OF CARE
Problem: Discharge Planning  Goal: Discharge to home or other facility with appropriate resources  4/25/2023 1140 by Marisa Avelar RN  Outcome: Progressing  4/24/2023 2224 by Vincent Tejada RN  Outcome: Progressing  Flowsheets  Taken 4/24/2023 2037 by Vincent Tejada RN  Discharge to home or other facility with appropriate resources:   Identify barriers to discharge with patient and caregiver   Arrange for needed discharge resources and transportation as appropriate   Identify discharge learning needs (meds, wound care, etc)   Refer to discharge planning if patient needs post-hospital services based on physician order or complex needs related to functional status, cognitive ability or social support system  Taken 4/24/2023 1350 by Marisa Avelar RN  Discharge to home or other facility with appropriate resources:   Identify barriers to discharge with patient and caregiver   Arrange for needed discharge resources and transportation as appropriate   Identify discharge learning needs (meds, wound care, etc)   Arrange for interpreters to assist at discharge as needed   Refer to discharge planning if patient needs post-hospital services based on physician order or complex needs related to functional status, cognitive ability or social support system     Problem: Safety - Adult  Goal: Free from fall injury  4/25/2023 1140 by Marisa Avelar RN  Outcome: Progressing  4/24/2023 2224 by Vincent Tejada RN  Outcome: Progressing  Flowsheets (Taken 4/24/2023 1350 by Marisa Avelar RN)  Free From Fall Injury:   Instruct family/caregiver on patient safety   Based on caregiver fall risk screen, instruct family/caregiver to ask for assistance with transferring infant if caregiver noted to have fall risk factors     Problem: Neurosensory - Adult  Goal: Achieves maximal functionality and self care  4/25/2023 1140 by Marisa Avelar RN  Outcome: Progressing  4/24/2023 2224 by Vincent Tejada RN  Outcome: Progressing  Flowsheets  Taken

## 2023-04-25 NOTE — PROGRESS NOTES
Hospitalist Progress Note    Patient:  Kristin Noble      Unit/Bed:8B-38/038-A    YOB: 1950    MRN: [id number]       Acct: [de-identified]     PCP: No primary care provider on file. Date of Admission: 4/24/2023    Assessment/Plan:    Giovanny Spain hematuria--appreciate urology input; H/H stable, CT urogram noted~planning cystoscopy outpatient; CBI stopped; urine is not infected; patient urinated twice since a Mar catheter was removed today without issues, plan by urology is to hold aspirin until seen in the office on Monday, May 1, no further hematuria  BPH--Flomax  ELINOR on CPAP  Glaucoma  CAD--statin, holding aspirin per urology   Ocular myasthenia gravis--follows with Dr Mike Jung; appears per medication review that he is not taking his prednisone any longer  Prostatomegaly--PSA pending and urology to follow-up outpatient  Colonic diverticulosis without diverticulitis      Expected discharge date: 4/25    Disposition:    [x] Home       [] TCU       [] Rehab       [] Psych       [] SNF       [] [place]       [] Other-    Chief Complaint: Urinating blood    Hospital Course: Per H&P done 4/24/2023: Kaela Underwood is a 74 y.o. male with PMHx that includes: BPH, CAD, glaucoma, ocular myasthenia gravis, ELINOR who presented to [facility] with chief complaint of gross hematuria. Patient is alert, oriented x 3. NAD. States yesterday, around lunch time, he urinated. Due to BPH, he does have difficulty with complete emptying. He felt he needed to urinate further so waited a moment and \"pushed\", states he did indeed urinate more and felt relief however noticed a clot. Patient reports continued hematuria hence presentation to ED for further evaluation. No h/o urology consultation, BPH managed with Flomax per PCP. Denies injury/trauma, abdominal pain or pressure. \"    4/25--> hemodynamically stable, CT urogram noted; discussed with Zan Cough from urology, Mar catheter was removed, patient urinated
Pt discharge home with wife transporting; Discharge packet reviewed with pt regarding: medications, follow up appts, discharge instructions, and pt education with no further questions or concerns voiced at this time.
Removed burgos catheter; Pt tolerated well.
intravenous contrast (2 minute delay) and 5 mm axial imaging through the abdomen and pelvis with intravenous contrast (8 minute delay). Coronal and sagittal reconstructions of the abdomen and pelvis (8 minute delay) were also performed. All CT scans at this facility use dose modulation, iterative reconstruction, and/or weight based dosing when appropriate to reduce the radiation dose to as low as reasonably achievable. CONTRAST: 80 cc Isovue-370. FINDINGS:        Lung bases: Scarring/dependent atelectasis is noted at the lung bases. Liver/gallbladder/bilary tree: No radiopaque gallstones or biliary ductal dilatation is identified. Hepatic steatosis is observed. No liver lesions are present. Pancreas: Normal.   Spleen : Normal.   Adrenal glands: Normal.       Kidneys/ ureters/ bladder: A 14 x 15 mm rounded lesion along the right posterior wall of the urinary bladder is slightly hyperdense (series 2, image 80). On the delayed images it appears more central within the urinary bladder (series 4, image 78). Asymmetric bladder wall thickening is out of proportion to the degree of underdistention. No renal calculus, hydronephrosis, or hydroureter is present. A nonenhancing simple cyst in the upper pole of the right kidney measures 7 mm (series 3, image 38). Additional 2 to 3 mm hypoattenuating bilateral renal lesions are too small to characterize definitively. No ureteral lesions are observed. Gastrointestinal:  Colonic diverticulosis without diverticulitis is observed. A moderate amount of retained fecal material is seen within the rectosigmoid colon. No bowel obstruction or pericolonic inflammation is observed. The appendix is normal. No    free fluid, fluid collection, or free air is visualized. Retroperitoneum / lymph nodes: The aorta is not dilated. No lymphadenopathy is present. Pelvis: The prostate gland measures 3.8 x 6.8 cm.        Musculoskeletal:

## 2023-04-25 NOTE — CARE COORDINATION
4/25/23, 2:55 PM EDT    DISCHARGE ON Jona Hooks day: 0  Location: -/038-A Reason for admit: Urinary retention [R33.9]  Gross hematuria [R31.0]  Hematuria [R31.9]   Procedure: 4-24-23 CT Urogram  1. A rounded lesion along the posterior wall of the urinary bladder on the precontrast images appears persistent on the delayed postcontrast images but more centrally located within the bladder may represent floating clot/debris although a solid polypoid    lesion is not excluded. There is also asymmetric bladder wall thickening along the anterior right lateral wall of the urinary bladder measuring up to 16 mm (series 2, image 77). . Correlation with cystoscopy is recommended for further assessment. 2. Prostatomegaly. Correlate with PSA levels. No renal calculus or obstructive uropathy. A simple cyst in the right kidney measures 7 mm. Additional bilateral 2 to 3 mm hypoattenuating renal lesions are too small to characterize definitively. 3. Colonic diverticulosis without diverticulitis. Moderate retained fecal material seen throughout the colon particularly in the rectosigmoid colon. No bowel obstruction. 4. Chronic findings are discussed. Barriers to Discharge: Admtted with Hematuria and Urinary retention. Urology consulted. CBI initiated in ER. Urogram yesterday. PCP: KEVIN Peraza CNP   Patient Goals/Plan/Treatment Preferences: Upon attempt to see pt today, it is noted that pt has already been discharged home. Follow up PCP appt was made per unit . Also, Urology appt scheduled. No CM visit was made.

## 2023-05-01 ENCOUNTER — PROCEDURE VISIT (OUTPATIENT)
Dept: UROLOGY | Age: 73
End: 2023-05-01
Payer: OTHER GOVERNMENT

## 2023-05-01 ENCOUNTER — TELEPHONE (OUTPATIENT)
Dept: UROLOGY | Age: 73
End: 2023-05-01

## 2023-05-01 VITALS — BODY MASS INDEX: 28.73 KG/M2 | WEIGHT: 194 LBS | TEMPERATURE: 98.7 F | HEIGHT: 69 IN

## 2023-05-01 DIAGNOSIS — Z01.818 PRE-OP TESTING: ICD-10-CM

## 2023-05-01 DIAGNOSIS — R31.0 GROSS HEMATURIA: Primary | ICD-10-CM

## 2023-05-01 DIAGNOSIS — N39.0 URINARY TRACT INFECTION WITHOUT HEMATURIA, SITE UNSPECIFIED: Primary | ICD-10-CM

## 2023-05-01 DIAGNOSIS — D49.4 BLADDER TUMOR: ICD-10-CM

## 2023-05-01 DIAGNOSIS — N40.0 BENIGN PROSTATIC HYPERPLASIA, UNSPECIFIED WHETHER LOWER URINARY TRACT SYMPTOMS PRESENT: Primary | ICD-10-CM

## 2023-05-01 DIAGNOSIS — R31.0 GROSS HEMATURIA: ICD-10-CM

## 2023-05-01 LAB
BILIRUBIN URINE: ABNORMAL
BLOOD URINE, POC: ABNORMAL
CHARACTER, URINE: ABNORMAL
COLOR, URINE: ABNORMAL
GLUCOSE URINE: NEGATIVE MG/DL
KETONES, URINE: 40
LEUKOCYTE CLUMPS, URINE: ABNORMAL
NITRITE, URINE: POSITIVE
PH, URINE: 6 (ref 5–9)
POST VOID RESIDUAL (PVR): 116 ML
PROTEIN, URINE: 30 MG/DL
SPECIFIC GRAVITY, URINE: 1.02 (ref 1–1.03)
UROBILINOGEN, URINE: 2 EU/DL (ref 0–1)

## 2023-05-01 PROCEDURE — 81003 URINALYSIS AUTO W/O SCOPE: CPT | Performed by: UROLOGY

## 2023-05-01 PROCEDURE — 51798 US URINE CAPACITY MEASURE: CPT | Performed by: UROLOGY

## 2023-05-01 PROCEDURE — 99214 OFFICE O/P EST MOD 30 MIN: CPT | Performed by: UROLOGY

## 2023-05-01 PROCEDURE — 1123F ACP DISCUSS/DSCN MKR DOCD: CPT | Performed by: UROLOGY

## 2023-05-01 PROCEDURE — 52000 CYSTOURETHROSCOPY: CPT | Performed by: UROLOGY

## 2023-05-01 RX ORDER — TAMSULOSIN HYDROCHLORIDE 0.4 MG/1
0.4 CAPSULE ORAL 2 TIMES DAILY
Qty: 60 CAPSULE | Refills: 1 | Status: SHIPPED | OUTPATIENT
Start: 2023-05-01 | End: 2023-05-01

## 2023-05-01 RX ORDER — TAMSULOSIN HYDROCHLORIDE 0.4 MG/1
0.4 CAPSULE ORAL 2 TIMES DAILY
Qty: 60 CAPSULE | Refills: 1 | Status: SHIPPED | OUTPATIENT
Start: 2023-05-01 | End: 2023-05-31

## 2023-05-01 RX ORDER — CIPROFLOXACIN 500 MG/1
500 TABLET, FILM COATED ORAL 2 TIMES DAILY
Qty: 14 TABLET | Refills: 0 | Status: SHIPPED | OUTPATIENT
Start: 2023-05-01 | End: 2023-05-04

## 2023-05-01 NOTE — TELEPHONE ENCOUNTER
SURGERY [address]  [address] [place], [place] [address] [facility] [address] Nan Senior, [address]      Phone *[phone] *[phone]   Surgical Scheduling Direct Line Phone *[phone] Fax *[phone]      Lisa Cloud 1950 male    [address]  [address] [facility] [address]  Marital Status:          Home Phone: [phone]      Cell Phone:    Telephone Information:   Mobile [phone]          Surgeon: Dr. Felipe Velasquez Surgery Date: 5/23/2023   Time: 7:30am    Procedure: Cystoscopy, Transurethral Resection of Bladder Tumor    Diagnosis: bladder tumor     Important Medical History:  In Epic    Special Inst/Equip:     CPT Codes:    73753  Latex Allergy: No     Cardiac Device:  No    Anesthesia:  General          Admission Type:  Same Day                        Admit Prior to Day of Surgery: No    Case Location:  Main OR            Preadmission Testing:  Phone Call          PAT Date and Time:______________________________________________________    PAT Confirmation #: ______________________________________________________    Post Op Visit: ___________________________________________________________    Need Preop Cardiac Clearance: No    Does Patient have Cardiologist/physician?     none    Surgery Confirmation #: __________________________________________________    : ________________________   Date: __________________________     [employer] Name: [place]

## 2023-05-01 NOTE — TELEPHONE ENCOUNTER
DO NOT TAKE FISH OIL, IBUPROFEN, MOTRIN-LIKE DRUGS AND ANY MULTIVITAMINS OR OVER THE COUNTER SUPPLEMENTS 14 DAYS PRIOR TO SURGERY. MUST HAVE AN ADULT OVER THE AGE OF 18 WITH YOU AT THE TIME OF THE PROCEDURE AND WITH YOU AT HOME AFTER THE PROCEDURE FOR 24 HOURS       Sheppard Gottron 1950 Diagnosis:     Surgical Physician: Dr. Prince Nayak have been scheduled for the procedure marked below:      Surgery: Cystoscopy, Transurethral Resection of Bladder Tumor         Date: 5/23/2023     Anesthesia: Anesthesiologist (General/Spinal)     Place of Service: [address] [place] [address] Second Floor Same Day Surgery         Arrive to same day surgery by:  6:00am  (Surgery time is subject to change)      INSTRUCTIONS AS MARKED BELOW:    1.  DO NOT eat or drink anything after midnight before surgery. 2.  We prefer you shower or bathe with an antibacterial soap (Dial) the morning of surgery. 3  Please bring a current medication list, photo ID and insurance card(s) with you  4. Okay to take Tylenol  5. If you take Glucophage, Metformin or Janumet, hold 48-hours prior to surgery  6. Take blood pressure or heart medication as directed, if taken in the morning take with a small sip of water  7. The office will call you in 1-2 days after your procedure to schedule a follow up.       DATE SENSITIVE TESTING-DO ON THE DATE LISTED*WALK IN * NO APPOINTMENT NEEDED    DO EKG AND CHEST XRAY NOW; ORDERS INCLUDED        Date: 5/1/2023

## 2023-05-01 NOTE — TELEPHONE ENCOUNTER
Patient is scheduled with Dr. Tye Forde on 5/23/2023. Surgery consent to be done upon arrival.  Patient to do EKG and chest xray; orders given to patient. Surgery instructions gone over with patient in the office; patient given copy as well. Patient will have an adult over the age of 25 with them at discharge and 24 hours after procedure.

## 2023-05-01 NOTE — PROGRESS NOTES
Enedina Enciso MD   Urology Clinic office Visit      Patient:  Roberto Shine  YOB: 1950  Date: 5/1/2023    HISTORY OF PRESENT ILLNESS:   The patient is a 74 y.o. male who presents today for evaluation of the following problem(s):      1. Benign prostatic hyperplasia, unspecified whether lower urinary tract symptoms present    2. Gross hematuria    3. Bladder tumor           Overall the problem(s) : are worsening. Associated Symptoms: No dysuria, gross hematuria. Pain Severity:      Summary of old records: denies seeing urologist in the past  (Patient's old records, notes and chart reviewed and summarized above.)      Onset 4/24/2023  Severity is described as profound, BPH and gross hematuria. Possible bladder tumor on CT  The course of symptoms over time is sudden. Alleviating factors: managed by burgos  Worsening factors: blood thinners  Lower urinary tract symptoms: decreased urinary stream; hesitancy for many years despite Flomax    I independently reviewed and verified the images and reports from:    CT UROGRAM    Result Date: 4/24/2023  PROCEDURE: CT UROGRAM CLINICAL INFORMATION: Hematuria. COMPARISON: No prior study. TECHNIQUE: 5 mm axial imaging through the abdomen and pelvis without contrast, 5 mm axial imaging through the abdomen with intravenous contrast (2 minute delay) and 5 mm axial imaging through the abdomen and pelvis with intravenous contrast (8 minute delay). Coronal and sagittal reconstructions of the abdomen and pelvis (8 minute delay) were also performed. All CT scans at this facility use dose modulation, iterative reconstruction, and/or weight based dosing when appropriate to reduce the radiation dose to as low as reasonably achievable. CONTRAST: 80 cc Isovue-370. FINDINGS: Lung bases: Scarring/dependent atelectasis is noted at the lung bases. Liver/gallbladder/bilary tree: No radiopaque gallstones or biliary ductal dilatation is identified.  Hepatic steatosis is

## 2023-05-03 ENCOUNTER — HOSPITAL ENCOUNTER (OUTPATIENT)
Dept: GENERAL RADIOLOGY | Age: 73
Discharge: HOME | End: 2023-05-03
Payer: OTHER GOVERNMENT

## 2023-05-03 ENCOUNTER — HOSPITAL ENCOUNTER (OUTPATIENT)
Age: 73
Discharge: HOME | End: 2023-05-03
Payer: OTHER GOVERNMENT

## 2023-05-03 DIAGNOSIS — D49.4 BLADDER TUMOR: ICD-10-CM

## 2023-05-03 DIAGNOSIS — Z01.818 PRE-OP TESTING: ICD-10-CM

## 2023-05-03 DIAGNOSIS — R31.0 GROSS HEMATURIA: ICD-10-CM

## 2023-05-03 LAB
EKG ATRIAL RATE: 77 BPM
EKG P AXIS: 55 DEGREES
EKG P-R INTERVAL: 182 MS
EKG Q-T INTERVAL: 382 MS
EKG QRS DURATION: 98 MS
EKG QTC CALCULATION (BAZETT): 432 MS
EKG R AXIS: 55 DEGREES
EKG T AXIS: 49 DEGREES
EKG VENTRICULAR RATE: 77 BPM

## 2023-05-03 PROCEDURE — 71046 X-RAY EXAM CHEST 2 VIEWS: CPT

## 2023-05-03 PROCEDURE — 93005 ELECTROCARDIOGRAM TRACING: CPT

## 2023-05-04 ENCOUNTER — TELEPHONE (OUTPATIENT)
Dept: UROLOGY | Age: 73
End: 2023-05-04

## 2023-05-04 ENCOUNTER — PREP FOR PROCEDURE (OUTPATIENT)
Dept: UROLOGY | Age: 73
End: 2023-05-04

## 2023-05-04 DIAGNOSIS — N30.01 ACUTE CYSTITIS WITH HEMATURIA: Primary | ICD-10-CM

## 2023-05-04 LAB
BACTERIA UR CULT: ABNORMAL
ORGANISM: ABNORMAL

## 2023-05-04 RX ORDER — SULFAMETHOXAZOLE AND TRIMETHOPRIM 800; 160 MG/1; MG/1
1 TABLET ORAL 2 TIMES DAILY
Qty: 20 TABLET | Refills: 0 | Status: SHIPPED | OUTPATIENT
Start: 2023-05-04 | End: 2023-05-14

## 2023-05-04 NOTE — TELEPHONE ENCOUNTER
Pt's urine culture significant for Staph Epi. Stop Cipro and start Bactrim. Repeat Urine culture after completion of antibiotic.

## 2023-05-10 RX ORDER — SODIUM CHLORIDE 0.9 % (FLUSH) 0.9 %
5-40 SYRINGE (ML) INJECTION EVERY 12 HOURS SCHEDULED
Status: CANCELLED | OUTPATIENT
Start: 2023-05-10

## 2023-05-10 RX ORDER — SODIUM CHLORIDE 9 MG/ML
INJECTION, SOLUTION INTRAVENOUS PRN
Status: CANCELLED | OUTPATIENT
Start: 2023-05-10

## 2023-05-10 RX ORDER — SODIUM CHLORIDE 0.9 % (FLUSH) 0.9 %
5-40 SYRINGE (ML) INJECTION PRN
Status: CANCELLED | OUTPATIENT
Start: 2023-05-10

## 2023-05-10 RX ORDER — IPRATROPIUM BROMIDE AND ALBUTEROL SULFATE 2.5; .5 MG/3ML; MG/3ML
1 SOLUTION RESPIRATORY (INHALATION) EVERY 4 HOURS PRN
Status: CANCELLED | OUTPATIENT
Start: 2023-05-23

## 2023-05-19 RX ORDER — ACETAMINOPHEN 500 MG
500 TABLET ORAL EVERY 6 HOURS PRN
COMMUNITY

## 2023-05-19 RX ORDER — DOCUSATE SODIUM 100 MG/1
100 CAPSULE, LIQUID FILLED ORAL 2 TIMES DAILY PRN
COMMUNITY

## 2023-05-23 ENCOUNTER — ANESTHESIA (OUTPATIENT)
Dept: OPERATING ROOM | Age: 73
End: 2023-05-23
Payer: OTHER GOVERNMENT

## 2023-05-23 ENCOUNTER — HOSPITAL ENCOUNTER (OUTPATIENT)
Age: 73
Setting detail: OUTPATIENT SURGERY
Discharge: HOME | End: 2023-05-23
Attending: UROLOGY | Admitting: UROLOGY
Payer: OTHER GOVERNMENT

## 2023-05-23 ENCOUNTER — ANESTHESIA EVENT (OUTPATIENT)
Dept: OPERATING ROOM | Age: 73
End: 2023-05-23
Payer: OTHER GOVERNMENT

## 2023-05-23 VITALS
RESPIRATION RATE: 14 BRPM | DIASTOLIC BLOOD PRESSURE: 57 MMHG | OXYGEN SATURATION: 96 % | SYSTOLIC BLOOD PRESSURE: 118 MMHG | HEART RATE: 75 BPM | TEMPERATURE: 97.5 F | HEIGHT: 69 IN | WEIGHT: 195.4 LBS | BODY MASS INDEX: 28.94 KG/M2

## 2023-05-23 DIAGNOSIS — G89.18 POST-OP PAIN: Primary | ICD-10-CM

## 2023-05-23 PROCEDURE — 6360000002 HC RX W HCPCS: Performed by: NURSE ANESTHETIST, CERTIFIED REGISTERED

## 2023-05-23 PROCEDURE — 2720000010 HC SURG SUPPLY STERILE: Performed by: UROLOGY

## 2023-05-23 PROCEDURE — 7100000011 HC PHASE II RECOVERY - ADDTL 15 MIN: Performed by: UROLOGY

## 2023-05-23 PROCEDURE — 3700000000 HC ANESTHESIA ATTENDED CARE: Performed by: UROLOGY

## 2023-05-23 PROCEDURE — 2580000003 HC RX 258: Performed by: NURSE ANESTHETIST, CERTIFIED REGISTERED

## 2023-05-23 PROCEDURE — 3600000003 HC SURGERY LEVEL 3 BASE: Performed by: UROLOGY

## 2023-05-23 PROCEDURE — 3700000001 HC ADD 15 MINUTES (ANESTHESIA): Performed by: UROLOGY

## 2023-05-23 PROCEDURE — 88307 TISSUE EXAM BY PATHOLOGIST: CPT

## 2023-05-23 PROCEDURE — 2709999900 HC NON-CHARGEABLE SUPPLY: Performed by: UROLOGY

## 2023-05-23 PROCEDURE — 7100000001 HC PACU RECOVERY - ADDTL 15 MIN: Performed by: UROLOGY

## 2023-05-23 PROCEDURE — 2580000003 HC RX 258: Performed by: UROLOGY

## 2023-05-23 PROCEDURE — 7100000010 HC PHASE II RECOVERY - FIRST 15 MIN: Performed by: UROLOGY

## 2023-05-23 PROCEDURE — 3600000013 HC SURGERY LEVEL 3 ADDTL 15MIN: Performed by: UROLOGY

## 2023-05-23 PROCEDURE — 7100000000 HC PACU RECOVERY - FIRST 15 MIN: Performed by: UROLOGY

## 2023-05-23 RX ORDER — PROPOFOL 10 MG/ML
INJECTION, EMULSION INTRAVENOUS PRN
Status: DISCONTINUED | OUTPATIENT
Start: 2023-05-23 | End: 2023-05-23 | Stop reason: SDUPTHER

## 2023-05-23 RX ORDER — CIPROFLOXACIN 500 MG/1
500 TABLET, FILM COATED ORAL 2 TIMES DAILY
Qty: 10 TABLET | Refills: 0 | Status: SHIPPED | OUTPATIENT
Start: 2023-05-23 | End: 2023-05-28

## 2023-05-23 RX ORDER — IPRATROPIUM BROMIDE AND ALBUTEROL SULFATE 2.5; .5 MG/3ML; MG/3ML
1 SOLUTION RESPIRATORY (INHALATION) EVERY 4 HOURS PRN
Status: DISCONTINUED | OUTPATIENT
Start: 2023-05-23 | End: 2023-05-23 | Stop reason: HOSPADM

## 2023-05-23 RX ORDER — SODIUM CHLORIDE 0.9 % (FLUSH) 0.9 %
5-40 SYRINGE (ML) INJECTION EVERY 12 HOURS SCHEDULED
Status: DISCONTINUED | OUTPATIENT
Start: 2023-05-23 | End: 2023-05-23 | Stop reason: HOSPADM

## 2023-05-23 RX ORDER — ASPIRIN 81 MG/1
81 TABLET ORAL DAILY
COMMUNITY

## 2023-05-23 RX ORDER — PHENAZOPYRIDINE HYDROCHLORIDE 200 MG/1
200 TABLET, FILM COATED ORAL 3 TIMES DAILY PRN
Qty: 15 TABLET | Refills: 1 | Status: SHIPPED | OUTPATIENT
Start: 2023-05-23 | End: 2023-05-28

## 2023-05-23 RX ORDER — FENTANYL CITRATE 50 UG/ML
INJECTION, SOLUTION INTRAMUSCULAR; INTRAVENOUS PRN
Status: DISCONTINUED | OUTPATIENT
Start: 2023-05-23 | End: 2023-05-23 | Stop reason: SDUPTHER

## 2023-05-23 RX ORDER — SODIUM CHLORIDE 0.9 % (FLUSH) 0.9 %
5-40 SYRINGE (ML) INJECTION PRN
Status: CANCELLED | OUTPATIENT
Start: 2023-05-23

## 2023-05-23 RX ORDER — SODIUM CHLORIDE 0.9 % (FLUSH) 0.9 %
5-40 SYRINGE (ML) INJECTION PRN
Status: DISCONTINUED | OUTPATIENT
Start: 2023-05-23 | End: 2023-05-23 | Stop reason: HOSPADM

## 2023-05-23 RX ORDER — LIDOCAINE HYDROCHLORIDE 20 MG/ML
INJECTION, SOLUTION INTRAVENOUS PRN
Status: DISCONTINUED | OUTPATIENT
Start: 2023-05-23 | End: 2023-05-23 | Stop reason: SDUPTHER

## 2023-05-23 RX ORDER — FENTANYL CITRATE 50 UG/ML
50 INJECTION, SOLUTION INTRAMUSCULAR; INTRAVENOUS EVERY 5 MIN PRN
Status: CANCELLED | OUTPATIENT
Start: 2023-05-23

## 2023-05-23 RX ORDER — HYDROCODONE BITARTRATE AND ACETAMINOPHEN 5; 325 MG/1; MG/1
1 TABLET ORAL EVERY 6 HOURS PRN
Qty: 10 TABLET | Refills: 0 | Status: SHIPPED | OUTPATIENT
Start: 2023-05-23 | End: 2023-05-28

## 2023-05-23 RX ORDER — SODIUM CHLORIDE 9 MG/ML
INJECTION, SOLUTION INTRAVENOUS PRN
Status: DISCONTINUED | OUTPATIENT
Start: 2023-05-23 | End: 2023-05-23 | Stop reason: HOSPADM

## 2023-05-23 RX ORDER — SODIUM CHLORIDE 0.9 % (FLUSH) 0.9 %
5-40 SYRINGE (ML) INJECTION EVERY 12 HOURS SCHEDULED
Status: CANCELLED | OUTPATIENT
Start: 2023-05-23

## 2023-05-23 RX ORDER — SODIUM CHLORIDE 9 MG/ML
INJECTION, SOLUTION INTRAVENOUS PRN
Status: CANCELLED | OUTPATIENT
Start: 2023-05-23

## 2023-05-23 RX ORDER — ONDANSETRON 2 MG/ML
INJECTION INTRAMUSCULAR; INTRAVENOUS PRN
Status: DISCONTINUED | OUTPATIENT
Start: 2023-05-23 | End: 2023-05-23 | Stop reason: SDUPTHER

## 2023-05-23 RX ORDER — SODIUM CHLORIDE 9 MG/ML
INJECTION, SOLUTION INTRAVENOUS CONTINUOUS PRN
Status: DISCONTINUED | OUTPATIENT
Start: 2023-05-23 | End: 2023-05-23 | Stop reason: SDUPTHER

## 2023-05-23 RX ORDER — FENTANYL CITRATE 50 UG/ML
25 INJECTION, SOLUTION INTRAMUSCULAR; INTRAVENOUS EVERY 5 MIN PRN
Status: CANCELLED | OUTPATIENT
Start: 2023-05-23

## 2023-05-23 RX ADMIN — ONDANSETRON 4 MG: 2 INJECTION INTRAMUSCULAR; INTRAVENOUS at 08:08

## 2023-05-23 RX ADMIN — FENTANYL CITRATE 50 MCG: 50 INJECTION, SOLUTION INTRAMUSCULAR; INTRAVENOUS at 07:55

## 2023-05-23 RX ADMIN — PROPOFOL 200 MG: 10 INJECTION, EMULSION INTRAVENOUS at 07:42

## 2023-05-23 RX ADMIN — Medication 2000 MG: at 07:46

## 2023-05-23 RX ADMIN — SODIUM CHLORIDE: 9 INJECTION, SOLUTION INTRAVENOUS at 06:39

## 2023-05-23 RX ADMIN — SODIUM CHLORIDE: 9 INJECTION, SOLUTION INTRAVENOUS at 07:38

## 2023-05-23 RX ADMIN — PHENYLEPHRINE HYDROCHLORIDE 100 MCG: 10 INJECTION INTRAVENOUS at 07:57

## 2023-05-23 RX ADMIN — LIDOCAINE HYDROCHLORIDE 100 MG: 20 INJECTION, SOLUTION INTRAVENOUS at 07:42

## 2023-05-23 RX ADMIN — PHENYLEPHRINE HYDROCHLORIDE 100 MCG: 10 INJECTION INTRAVENOUS at 08:00

## 2023-05-23 ASSESSMENT — PAIN SCALES - GENERAL
PAINLEVEL_OUTOF10: 3
PAINLEVEL_OUTOF10: 2

## 2023-05-23 ASSESSMENT — PAIN DESCRIPTION - DESCRIPTORS: DESCRIPTORS: DISCOMFORT

## 2023-05-23 ASSESSMENT — PAIN DESCRIPTION - LOCATION: LOCATION: FLANK

## 2023-05-23 ASSESSMENT — PAIN DESCRIPTION - ORIENTATION: ORIENTATION: RIGHT

## 2023-05-23 NOTE — ANESTHESIA PRE PROCEDURE
Unable to obtain       Social History:    Social History     Tobacco Use    Smoking status: Former     Packs/day: 1.00     Years: 15.00     Pack years: 15.00     Types: Cigarettes     Quit date: 1979     Years since quittin.4    Smokeless tobacco: Never   Substance Use Topics    Alcohol use: Yes     Alcohol/week: 0.0 standard drinks     Comment: socially- rarely                                Counseling given: Not Answered      Vital Signs (Current):   Vitals:    23 0615   BP: 138/74   Pulse: 76   Resp: 16   Temp: (!) 96.7 °F (35.9 °C)   TempSrc: Temporal   SpO2: 93%   Weight: 195 lb 6.4 oz (88.6 kg)   Height: 5' 9\" (1.753 m)                                              BP Readings from Last 3 Encounters:   23 138/74   23 (!) 144/77   21 (!) 143/79       NPO Status: Time of last liquid consumption:                         Time of last solid consumption:                         Date of last liquid consumption: 23                        Date of last solid food consumption: 23    BMI:   Wt Readings from Last 3 Encounters:   23 195 lb 6.4 oz (88.6 kg)   23 194 lb (88 kg)   23 194 lb 8 oz (88.2 kg)     Body mass index is 28.86 kg/m².     CBC:   Lab Results   Component Value Date/Time    WBC 7.7 2023 06:15 AM    RBC 5.43 2023 06:15 AM    RBC 5.59 11/15/2017 07:05 AM    HGB 15.8 2023 06:15 AM    HCT 49.1 2023 06:15 AM    MCV 90.4 2023 06:15 AM    RDW 13.1 11/15/2017 07:05 AM     2023 06:15 AM       CMP:   Lab Results   Component Value Date/Time     2023 06:15 AM    K 4.8 2023 06:15 AM     2023 06:15 AM    CO2 26 2023 06:15 AM    BUN 12 2023 06:15 AM    CREATININE 0.8 2023 06:15 AM    LABGLOM >60 2023 06:15 AM    GLUCOSE 134 2023 06:15 AM    GLUCOSE 99 11/15/2017 07:05 AM    PROT 6.9 2018 07:36 AM    CALCIUM 9.6 2023 06:15 AM

## 2023-05-23 NOTE — PROGRESS NOTES

## 2023-05-23 NOTE — PROGRESS NOTES
Pt returned to [facility] room 5. Vitals and assessment as charted. 0.9 infusing, @100ml to count from PACU. Pt has muffin and water. Family at the bedside. Pt and family verbalized understanding of discharge criteria and call light use. Call light in reach.

## 2023-05-23 NOTE — OP NOTE
Operative Note      Patient: Roselia Lai  YOB: 1950  MRN: [id number]    Date of Procedure: 5/23/2023    Pre-Op Diagnosis Codes: * Bladder tumor [D49.4]    Post-Op Diagnosis: Same       Procedure(s):  CYSTOSCOPY TURBT, large ~4-5 cm     Surgeon(s):  Moncho Pablo MD    Assistant:   * No surgical staff found *    Anesthesia: General    Estimated Blood Loss (mL): Minimal    Complications: None    Specimens:   ID Type Source Tests Collected by Time Destination   A : bladder tumor Tissue Bladder SURGICAL PATHOLOGY Moncho Pablo MD 5/23/2023 0800        Implants:  * No implants in log *      Drains:   Urinary Catheter 05/23/23 (Active)   Site Assessment No urethral drainage 05/23/23 1000   Urine Color Bloody 05/23/23 1000   Collection Container Standard 05/23/23 1000   Status Draining 05/23/23 1000       [REMOVED] Urinary Catheter 04/24/23 3 Way (Removed)   $ Urethral catheter insertion $ Not inserted for procedure 04/24/23 1331   Catheter Indications Urinary retention (acute or chronic), continuous bladder irrigation or bladder outlet obstruction 04/25/23 0815   Site Assessment Pink; No urethral drainage 04/25/23 0815   Urine Color Cherry 04/25/23 0815   Urine Appearance Red flecks 04/25/23 0815   Urine Odor Other (Comment) 04/25/23 0815   Collection Container Standard 04/25/23 0815   Securement Method Securing device (Describe) 04/25/23 0815   Catheter Care  Other (comment) 04/25/23 0500   Catheter Best Practices  Drainage tube clipped to bed;Catheter secured to thigh; Tamper seal intact; Bag below bladder;Bag not on floor; Lack of dependent loop in tubing;Drainage bag less than half full 04/25/23 0815   Status Continuous bladder irrigation;Clamped;Draining;Patent 04/25/23 0815   Manual Irrigation Volume Input (mL) 180 mL 04/24/23 1644   Output (mL) 800 mL 04/25/23 0500       Findings: medium to large posterior wall tumor      Detailed Description of Procedure:    All treatment options were

## 2023-05-23 NOTE — H&P
History and Physical    Patient:  Jeremy Scott  MRN: [id number]  YOB: 1950    CHIEF COMPLAINT:  bladder tumor    HISTORY OF PRESENT ILLNESS:   The patient is a 74 y.o. male who presents with as above, here for surgery    Patient's old records, notes and chart reviewed and summarized above. Past Medical History:    Past Medical History:   Diagnosis Date    Arthritis     BPH (benign prostatic hyperplasia)     CAD (coronary artery disease)     Cataract     Diverticulitis     Diverticulosis     sigmoid    Glaucoma     History of colonic polyps     History of Helicobacter pylori infection     1990    Hyperlipidemia     Internal hemorrhoid     Ocular myasthenia gravis ([address]     OS    Tattoo     Tinnitus     Unspecified sleep apnea        Past Surgical History:    Past Surgical History:   Procedure Laterality Date    BACK SURGERY      s 3, betzaida placement    CARDIAC CATHETERIZATION  1999    Shweta, \"suspected ischemia\"    CATARACT REMOVAL WITH IMPLANT Bilateral 2005    COLONOSCOPY  2013, 2016    [facility]    Unable to obtain     Medications Prior to Admission:    Prior to Admission medications    Medication Sig Start Date End Date Taking?  Authorizing Provider   aspirin 81 MG EC tablet Take 1 tablet by mouth daily   Yes Historical Provider, MD   docusate sodium (COLACE) 100 MG capsule Take 1 capsule by mouth 2 times daily as needed for Constipation   Yes Historical Provider, MD   acetaminophen (TYLENOL) 500 MG tablet Take 1 tablet by mouth every 6 hours as needed for Pain or Fever   Yes Historical Provider, MD   Apoaequorin (PREVAGEN PO) Take 1 capsule by mouth daily   Yes Historical Provider, MD   tamsulosin (FLOMAX) 0.4 MG capsule Take 1 capsule by mouth in the morning and at bedtime 5/1/23 5/31/23  Sara Hart MD   Famotidine (PEPCID PO) Take by mouth as needed    Historical Provider, MD   CPAP Machine MISC by

## 2023-05-23 NOTE — PROGRESS NOTES
Patient admitted to [facility] room 5 with wife at bedside. Bed in low position side rails up call light in reach. Patient denies questions at this time.

## 2023-05-23 NOTE — DISCHARGE INSTRUCTIONS
Call your doctor for the following:   Chills   Temperature greater than 101   Pain that is not tolerable despite taking pain medicine as ordered   Inability to urinate >12 hours/ or a non-draining burgos         No alcoholic beverages, no driving or operating machinery, no making important decisions for 24 hours. Take your prescribed medications (if any) as instructed. You may have a normal diet but should eat lightly on the day of surgery. Drink plenty of fluids. You may notice some burning or blood with urination, this is normal and should improve over next few days. You may also take motrin/ibuprofen for pain control, you can alternate this with your other pain medications. Be sure to take over the counter stool softeners if you notice constipation or hard stools. Follow up with Dr. Ángel Garcia, office will arrange.      Resume blood thinners in 5 days

## 2023-05-23 NOTE — PROGRESS NOTES
2293- pt to pacu. Resp easy, unlabored. Vss. Pt appears in no acute distress. 80- pt continues to deny pain, nausea. Resting in bed eyes closed. Responds quickly to verbal stimuli. 7762- pt continues to rest in bed eyes open. Follows command for deep breathe and coughing. Continues to deny pain, nausea.      2613- pt meets criteria for discharge from pacu    0855 pt returned to [facility], report given to [facility]

## 2023-05-23 NOTE — ANESTHESIA POSTPROCEDURE EVALUATION
Department of Anesthesiology  Postprocedure Note    Patient: Earline Mireles  MRN: [id number]  YOB: 1950  Date of evaluation: 5/23/2023      Procedure Summary     Date: 05/23/23 Room / Location: [facility]  / [facility]    Anesthesia Start: 0738 Anesthesia Stop: [id number]    Procedure: CYSTOSCOPY TURBT Diagnosis:       Bladder tumor      (Bladder tumor [D49.4])    Surgeons: Escobar Brock MD Responsible Provider: Aundrea Rosario DO    Anesthesia Type: General ASA Status: 3          Anesthesia Type: General    Major Phase I: Major Score: 10    Major Phase II: Major Score: 10      Anesthesia Post Evaluation    Comments: Candy Lara 58  POST-ANESTHESIA NOTE       Name:  Earline Mireles                                         Age:  74 y.o.   MRN:  [id number]      Last Vitals:  BP (!) 118/57   Pulse 75   Temp 97.5 °F (36.4 °C) (Temporal)   Resp 14   Ht 5' 9\" (1.753 m)   Wt 195 lb 6.4 oz (88.6 kg)   SpO2 96%   BMI 28.86 kg/m²   Patient Vitals in the past 4 hrs:  05/23/23 1000, BP:(!) 118/57, Pulse:75, Resp:14, SpO2:96 %  05/23/23 0930, BP:127/77, Pulse:70, Resp:12, SpO2:92 %  05/23/23 0900, BP:123/61, Temp:97.5 °F (36.4 °C), Temp src:Temporal, Pulse:68, Resp:12, SpO2:93 %  05/23/23 0855, BP:125/60, Pulse:63, Resp:11, SpO2:98 %  05/23/23 0850, BP:(!) 121/56, Pulse:65, Resp:(!) 9, SpO2:98 %  05/23/23 0845, BP:125/62, Pulse:67, Resp:20, SpO2:96 %  05/23/23 0840, BP:121/68, Pulse:70, Resp:13, SpO2:97 %  05/23/23 0835, BP:120/69, Pulse:72, Resp:11, SpO2:95 %  05/23/23 0830, BP:110/62, Pulse:74, Resp:(!) 31, SpO2:94 %  05/23/23 0825, BP:118/63, Pulse:77, Resp:20, SpO2:94 %  05/23/23 0820, BP:118/63, Temp:99 °F (37.2 °C), Temp src:Temporal, Pulse:78, Resp:20, SpO2:90 %    Level of Consciousness:  Awake    Respiratory:  Stable    Oxygen Saturation:  Stable    Cardiovascular:  Stable    Hydration:  Adequate    PONV:  Stable    Post-op Pain:  Adequate analgesia    Post-op Assessment:  No apparent anesthetic

## 2023-05-25 ENCOUNTER — NURSE ONLY (OUTPATIENT)
Dept: UROLOGY | Age: 73
End: 2023-05-25

## 2023-05-25 DIAGNOSIS — N40.0 BENIGN PROSTATIC HYPERPLASIA, UNSPECIFIED WHETHER LOWER URINARY TRACT SYMPTOMS PRESENT: Primary | ICD-10-CM

## 2023-05-25 PROCEDURE — 99999 PR OFFICE/OUTPT VISIT,PROCEDURE ONLY: CPT | Performed by: NURSE PRACTITIONER

## 2023-05-25 NOTE — PROGRESS NOTES
Patient has given me verbal consent to perform burgos removal  Yes    10 cc of water deflated from burgos balloon. 18 Fr burgos removed without difficulty. Foreskin reduced back down? Yes      Pt will drink fluids and  ER in 6-8 hours if patient unable to urinate. F/u with provider as scheduled .

## 2023-06-01 ENCOUNTER — OFFICE VISIT (OUTPATIENT)
Dept: UROLOGY | Age: 73
End: 2023-06-01
Payer: OTHER GOVERNMENT

## 2023-06-01 VITALS — BODY MASS INDEX: 28.88 KG/M2 | HEIGHT: 69 IN | RESPIRATION RATE: 16 BRPM | WEIGHT: 195 LBS

## 2023-06-01 DIAGNOSIS — N40.0 BENIGN PROSTATIC HYPERPLASIA, UNSPECIFIED WHETHER LOWER URINARY TRACT SYMPTOMS PRESENT: Primary | ICD-10-CM

## 2023-06-01 DIAGNOSIS — D49.4 BLADDER TUMOR: ICD-10-CM

## 2023-06-01 DIAGNOSIS — R31.0 GROSS HEMATURIA: ICD-10-CM

## 2023-06-01 PROCEDURE — 99214 OFFICE O/P EST MOD 30 MIN: CPT | Performed by: UROLOGY

## 2023-06-01 PROCEDURE — 1123F ACP DISCUSS/DSCN MKR DOCD: CPT | Performed by: UROLOGY

## 2023-06-01 NOTE — PROGRESS NOTES
Assessment and Plan      1. Benign prostatic hyperplasia, unspecified whether lower urinary tract symptoms present    2. Bladder tumor    3. Gross hematuria           BPH: flomax to BID; improved; may need outlet surgery in future    5/23/2023 CYSTOSCOPY TURBT, large ~4-5 cm   Findings: medium to large posterior wall tumor  FINAL DIAGNOSIS:   Bladder tumor, TURBT:     Noninvasive high-grade papillary urothelial carcinoma. Muscularis propria (detrusor muscle) is present and uninvolved. BCG induction in 1 month  2 months after that then cystoscopy      Prescriptions Ordered:  No orders of the defined types were placed in this encounter. Orders Placed:  No orders of the defined types were placed in this encounter. Naa Jimenes MD    No follow-ups on file.       Mitali Tong MD  [facility]

## 2023-06-22 ENCOUNTER — APPOINTMENT (OUTPATIENT)
Dept: GENERAL RADIOLOGY | Age: 73
End: 2023-06-22
Payer: OTHER GOVERNMENT

## 2023-06-22 ENCOUNTER — HOSPITAL ENCOUNTER (EMERGENCY)
Age: 73
Discharge: HOME | End: 2023-06-22
Payer: OTHER GOVERNMENT

## 2023-06-22 VITALS
OXYGEN SATURATION: 97 % | BODY MASS INDEX: 28.8 KG/M2 | DIASTOLIC BLOOD PRESSURE: 80 MMHG | TEMPERATURE: 98.1 F | HEART RATE: 79 BPM | SYSTOLIC BLOOD PRESSURE: 158 MMHG | RESPIRATION RATE: 20 BRPM | WEIGHT: 195 LBS

## 2023-06-22 DIAGNOSIS — S39.012A LUMBOSACRAL STRAIN, INITIAL ENCOUNTER: Primary | ICD-10-CM

## 2023-06-22 LAB
BILIRUB UR STRIP.AUTO-MCNC: NEGATIVE MG/DL
CHARACTER UR: CLEAR
COLOR: YELLOW
GLUCOSE UR QL STRIP.AUTO: NEGATIVE MG/DL
KETONES UR QL STRIP.AUTO: NEGATIVE
NITRITE UR QL STRIP.AUTO: NEGATIVE
PH UR STRIP.AUTO: 7 [PH] (ref 5–9)
PROT UR STRIP.AUTO-MCNC: NEGATIVE MG/DL
RBC #/AREA URNS HPF: ABNORMAL /[HPF]
SP GR UR STRIP.AUTO: 1.01 (ref 1–1.03)
UROBILINOGEN, URINE: 0.2 EU/DL (ref 0.2–1)
WBC #/AREA URNS HPF: ABNORMAL /[HPF]

## 2023-06-22 PROCEDURE — 99214 OFFICE O/P EST MOD 30 MIN: CPT | Performed by: NURSE PRACTITIONER

## 2023-06-22 PROCEDURE — 99214 OFFICE O/P EST MOD 30 MIN: CPT

## 2023-06-22 PROCEDURE — 72100 X-RAY EXAM L-S SPINE 2/3 VWS: CPT

## 2023-06-22 PROCEDURE — 81003 URINALYSIS AUTO W/O SCOPE: CPT

## 2023-06-22 PROCEDURE — 87086 URINE CULTURE/COLONY COUNT: CPT

## 2023-06-22 RX ORDER — CYCLOBENZAPRINE HCL 5 MG
5 TABLET ORAL 2 TIMES DAILY PRN
Qty: 10 TABLET | Refills: 0 | Status: SHIPPED | OUTPATIENT
Start: 2023-06-22 | End: 2023-07-02

## 2023-06-22 RX ORDER — PREDNISONE 20 MG/1
20 TABLET ORAL DAILY
Qty: 5 TABLET | Refills: 0 | Status: SHIPPED | OUTPATIENT
Start: 2023-06-22 | End: 2023-06-27

## 2023-06-22 ASSESSMENT — ENCOUNTER SYMPTOMS
NAUSEA: 0
VOMITING: 0
TROUBLE SWALLOWING: 0
COUGH: 0
BACK PAIN: 1
ABDOMINAL PAIN: 0
SORE THROAT: 0
EYE DISCHARGE: 0
EYE REDNESS: 0
SHORTNESS OF BREATH: 0
RHINORRHEA: 0
DIARRHEA: 0

## 2023-06-22 ASSESSMENT — PAIN DESCRIPTION - FREQUENCY: FREQUENCY: INTERMITTENT

## 2023-06-22 ASSESSMENT — PAIN DESCRIPTION - PAIN TYPE: TYPE: ACUTE PAIN

## 2023-06-22 ASSESSMENT — PAIN DESCRIPTION - DESCRIPTORS: DESCRIPTORS: ACHING

## 2023-06-22 ASSESSMENT — PAIN SCALES - GENERAL: PAINLEVEL_OUTOF10: 4

## 2023-06-22 ASSESSMENT — PAIN DESCRIPTION - ORIENTATION: ORIENTATION: RIGHT;MID

## 2023-06-22 ASSESSMENT — PAIN DESCRIPTION - LOCATION: LOCATION: BACK;FLANK

## 2023-06-22 ASSESSMENT — PAIN - FUNCTIONAL ASSESSMENT: PAIN_FUNCTIONAL_ASSESSMENT: 0-10

## 2023-06-22 NOTE — ED TRIAGE NOTES
Patient to room with c/o right flank and back pain beginning two weeks ago. Denies pain with urination. States pain began the day after he had been bending over to remove pain from his back deck. Urine specimen obtained.

## 2023-06-22 NOTE — ED PROVIDER NOTES
[place] [address]  Urgent Care Encounter      CHIEF COMPLAINT       Chief Complaint   Patient presents with    Back Pain     Right mid/lower, Flank pain       Nurses Notes reviewed and I agree except as noted in the HPI. HISTORY OF PRESENT ILLNESS   Sean Howell is a 74 y.o. male who presents with a 2-week history of right lower back pain. Patient states he was working on his driveway and felt like he pulled something in his back. This has not worsened but it has also not improved. Patient is denying urinary symptoms at this time. He is taking Tylenol for pain which is helpful. He denies fever, loss of sensation from the waist down, blood in the stool or blood in the urine, numbness and tingling, loss of bowel or bladder control, or other red flag symptoms at this time. He is eating and drinking well. Movement tends to make the pain worse and present pain level is 4/10 but will get up to 8/10 when he is changing positions or moving. Patient had bladder cancer and had surgery approximately 3 weeks ago. He has a CT scan scheduled tomorrow due to a lesion in his left lung that was noted. Patient has had 2 back surgeries and has rods in his back    REVIEW OF SYSTEMS     Review of Systems   Constitutional:  Negative for chills, diaphoresis, fatigue and fever. HENT:  Negative for congestion, ear pain, rhinorrhea, sore throat and trouble swallowing. Eyes:  Negative for discharge and redness. Respiratory:  Negative for cough and shortness of breath. Cardiovascular:  Negative for chest pain. Gastrointestinal:  Negative for abdominal pain, diarrhea, nausea and vomiting. Genitourinary:  Negative for decreased urine volume, difficulty urinating, dysuria, flank pain, frequency, genital sores, hematuria, penile discharge, penile pain, penile swelling, scrotal swelling, testicular pain and urgency. Musculoskeletal:  Positive for back pain. Negative for neck pain and neck stiffness.

## 2023-06-23 ENCOUNTER — HOSPITAL ENCOUNTER (OUTPATIENT)
Dept: CT IMAGING | Age: 73
Discharge: HOME | End: 2023-06-23
Payer: OTHER GOVERNMENT

## 2023-06-23 DIAGNOSIS — R91.1 COIN LESION: ICD-10-CM

## 2023-06-23 LAB
BACTERIA UR CULT: ABNORMAL
ORGANISM: ABNORMAL

## 2023-06-23 PROCEDURE — 71250 CT THORAX DX C-: CPT

## 2023-07-06 ENCOUNTER — NURSE ONLY (OUTPATIENT)
Dept: UROLOGY | Age: 73
End: 2023-07-06
Payer: OTHER GOVERNMENT

## 2023-07-06 DIAGNOSIS — D49.4 BLADDER TUMOR: Primary | ICD-10-CM

## 2023-07-06 LAB
BILIRUBIN URINE: NEGATIVE
BLOOD URINE, POC: ABNORMAL
CHARACTER, URINE: CLEAR
COLOR, URINE: YELLOW
GLUCOSE URINE: NEGATIVE MG/DL
KETONES, URINE: NEGATIVE
LEUKOCYTE CLUMPS, URINE: NEGATIVE
NITRITE, URINE: NEGATIVE
PH, URINE: 5.5 (ref 5–9)
PROTEIN, URINE: NEGATIVE MG/DL
SPECIFIC GRAVITY, URINE: >= 1.03 (ref 1–1.03)
UROBILINOGEN, URINE: 0.2 EU/DL (ref 0–1)

## 2023-07-06 PROCEDURE — 81003 URINALYSIS AUTO W/O SCOPE: CPT | Performed by: UROLOGY

## 2023-07-06 PROCEDURE — 99999 PR OFFICE/OUTPT VISIT,PROCEDURE ONLY: CPT | Performed by: UROLOGY

## 2023-07-06 PROCEDURE — 51720 TREATMENT OF BLADDER LESION: CPT | Performed by: UROLOGY

## 2023-07-13 ENCOUNTER — NURSE ONLY (OUTPATIENT)
Dept: UROLOGY | Age: 73
End: 2023-07-13
Payer: OTHER GOVERNMENT

## 2023-07-13 DIAGNOSIS — C67.9 MALIGNANT NEOPLASM OF URINARY BLADDER, UNSPECIFIED SITE (HCC): Primary | ICD-10-CM

## 2023-07-13 LAB
BILIRUBIN URINE: NEGATIVE
BLOOD URINE, POC: ABNORMAL
CHARACTER, URINE: CLEAR
COLOR, URINE: YELLOW
GLUCOSE URINE: NEGATIVE MG/DL
KETONES, URINE: NEGATIVE
LEUKOCYTE CLUMPS, URINE: NEGATIVE
NITRITE, URINE: NEGATIVE
PH, URINE: 6 (ref 5–9)
PROTEIN, URINE: ABNORMAL MG/DL
SPECIFIC GRAVITY, URINE: >= 1.03 (ref 1–1.03)
UROBILINOGEN, URINE: 0.2 EU/DL (ref 0–1)

## 2023-07-13 PROCEDURE — 51720 TREATMENT OF BLADDER LESION: CPT | Performed by: UROLOGY

## 2023-07-13 PROCEDURE — 81003 URINALYSIS AUTO W/O SCOPE: CPT | Performed by: UROLOGY

## 2023-07-13 NOTE — PROGRESS NOTES
Patient has given me verbal consent to perform BCG  Yes    Is patient currently taking any antibiotics for any medical conditions? no   If patient is taking antibiotics, did you get verbal okay from provider to give BCG? N/a    Does patient have latex allergy? No  Does patient have shellfish or betadine allergy? No      Following Dr. Rohini Emanuel plan of care. After insuring patient does not have any symptoms of a Urinary Tract Infection patient is wiped with betadine solution to cleanse urethra opening. 16 Fr straight catheter is inserted without difficulty, 50ml residual obtained, and bladder is completely emptied. BCG is then mixed with sodium chloride solution using closed transfer system. Then the catheter is attached to bulb syringe and BCG is then poured into the bulb syringe, bulb syringe is capped, and BCG is slowly instilled into bladder. Patient did not have a bladder spasms during instillation. Once all the solution was through the catheter tubing was removed from urethra and discarded into yellow biohazard bag. Pt was administered 50 mg of BCG today. BCG 2 OF 6 INSTILLED WITHOUT DIFFICULTY. Bladder tumor resection site: medium to large posterior wall tumor  Lot Number: [id number]  Expiration Date: 03/01/2024  [address] [place] [address] #: [id number]    Patient instructed once they are home to lay on their right side for 10 minutes, left side for 10 minutes, on their back for 10 minutes, and then on their stomach for 10 minutes. This will ensure that the medication coats the entire bladder. This is only a recommendation not a requirement. Patient was instructed to hold urine for 2 hours and then void, place 2 cups non-diluted bleach (Clorox) into the stool and let stand for 15 minutes prior to flushing. This is to be repeated every time the patient voids for the next 6 hours. Patient is instructed to drink fluids to flush out the bladder.  Patient is instructed to call the office ([phone]) if they have any reactions

## 2023-07-19 NOTE — TELEPHONE ENCOUNTER
Dora Austin called requesting a refill on the following medications:  Requested Prescriptions     Pending Prescriptions Disp Refills    tamsulosin (FLOMAX) 0.4 MG capsule 60 capsule 1     Sig: Take 1 capsule by mouth in the morning and at bedtime     Pharmacy verified: [facility]  .pv    CAN THIS BE SENT FOR 3 MONTHS SUPPLIES?  HE TAKES IT TWICE DAILY    Date of last visit: 7/13/2023  Date of next visit (if applicable): 5/73/5231

## 2023-07-20 ENCOUNTER — NURSE ONLY (OUTPATIENT)
Dept: UROLOGY | Age: 73
End: 2023-07-20
Payer: OTHER GOVERNMENT

## 2023-07-20 DIAGNOSIS — C67.9 MALIGNANT NEOPLASM OF URINARY BLADDER, UNSPECIFIED SITE (HCC): Primary | ICD-10-CM

## 2023-07-20 LAB
BILIRUBIN URINE: NEGATIVE
BLOOD URINE, POC: ABNORMAL
CHARACTER, URINE: CLEAR
COLOR, URINE: YELLOW
GLUCOSE URINE: NEGATIVE MG/DL
KETONES, URINE: NEGATIVE
LEUKOCYTE CLUMPS, URINE: NEGATIVE
NITRITE, URINE: NEGATIVE
PH, URINE: 5.5 (ref 5–9)
PROTEIN, URINE: ABNORMAL MG/DL
SPECIFIC GRAVITY, URINE: >= 1.03 (ref 1–1.03)
UROBILINOGEN, URINE: 0.2 EU/DL (ref 0–1)

## 2023-07-20 PROCEDURE — 51720 TREATMENT OF BLADDER LESION: CPT | Performed by: UROLOGY

## 2023-07-20 PROCEDURE — 81003 URINALYSIS AUTO W/O SCOPE: CPT | Performed by: UROLOGY

## 2023-07-20 RX ORDER — TAMSULOSIN HYDROCHLORIDE 0.4 MG/1
0.4 CAPSULE ORAL 2 TIMES DAILY
Qty: 60 CAPSULE | Refills: 1 | Status: SHIPPED | OUTPATIENT
Start: 2023-07-20

## 2023-07-20 NOTE — PROGRESS NOTES
Patient has given me verbal consent to perform BCG  Yes    Is patient currently taking any antibiotics for any medical conditions? No   If patient is taking antibiotics, did you get verbal okay from provider to give BCG? N/A    Does patient have latex allergy? No  Does patient have shellfish or betadine allergy? No      Following Dr. Ousmane Mart of [facility]. After insuring patient does not have any symptoms of a Urinary Tract Infection patient is wiped with betadine solution to cleanse urethra opening. 16 Fr straight catheter is inserted without difficulty, 10ml residual obtained, and bladder is completely emptied. BCG is then mixed with sodium chloride solution using closed transfer system. Then the catheter is attached to bulb syringe and BCG is then poured into the bulb syringe, bulb syringe is capped, and BCG is slowly instilled into bladder. Patient did not have a bladder spasms during instillation . Once all the solution was through the catheter tubing was removed from urethra and discarded into yellow biohazard bag. Pt was administered 50 mg of BCG today. BCG 3 OF 6 INSTILLED WITHOUT DIFFICULTY. Bladder tumor resection site: medium to large posterior wall tumor  Lot Number: [id number]  Expiration Date: 03/1/2024  [facility] #: [id number]    Patient instructed once they are home to lay on their right side for 10 minutes, left side for 10 minutes, on their back for 10 minutes, and then on their stomach for 10 minutes. This will ensure that the medication coats the entire bladder. This is only a recommendation not a requirement. Patient was instructed to hold urine for 2 hours and then void, place 2 cups non-diluted bleach (Clorox) into the stool and let stand for 15 minutes prior to flushing. This is to be repeated every time the patient voids for the next 6 hours. Patient is instructed to drink fluids to flush out the bladder.  Patient is instructed to call the office ([phone]) if they have any

## 2023-07-27 ENCOUNTER — NURSE ONLY (OUTPATIENT)
Dept: UROLOGY | Age: 73
End: 2023-07-27
Payer: OTHER GOVERNMENT

## 2023-07-27 DIAGNOSIS — C67.9 MALIGNANT NEOPLASM OF URINARY BLADDER, UNSPECIFIED SITE (HCC): Primary | ICD-10-CM

## 2023-07-27 LAB
BILIRUBIN URINE: NEGATIVE
BILIRUBIN, POC: NEGATIVE
BLOOD URINE, POC: ABNORMAL
BLOOD URINE, POC: NORMAL
CHARACTER, URINE: CLEAR
CLARITY, POC: CLEAR
COLOR, POC: YELLOW
COLOR, URINE: YELLOW
GLUCOSE URINE, POC: NEGATIVE
GLUCOSE URINE: NEGATIVE MG/DL
KETONES, POC: NEGATIVE
KETONES, URINE: NEGATIVE
LEUKOCYTE CLUMPS, URINE: NEGATIVE
LEUKOCYTE EST, POC: NEGATIVE
NITRITE, POC: NEGATIVE
NITRITE, URINE: NEGATIVE
PH, POC: 5.5
PH, URINE: 5.5 (ref 5–9)
PROTEIN, POC: NEGATIVE
PROTEIN, URINE: NEGATIVE MG/DL
SPECIFIC GRAVITY, POC: NORMAL
SPECIFIC GRAVITY, URINE: >= 1.03 (ref 1–1.03)
UROBILINOGEN, POC: NORMAL
UROBILINOGEN, URINE: 1 EU/DL (ref 0–1)

## 2023-07-27 PROCEDURE — 51720 TREATMENT OF BLADDER LESION: CPT

## 2023-07-27 PROCEDURE — 81003 URINALYSIS AUTO W/O SCOPE: CPT

## 2023-07-27 NOTE — PROGRESS NOTES
I have personally verified, reviewed, and approved these actions. Follow-up as scheduled for additional BCG treatment in 1 week.   Will need cystoscopy 2 months following completion of BCG treatments per Dr. Ng Bigger note

## 2023-07-27 NOTE — PROGRESS NOTES
Patient has given me verbal consent to perform BCG  Yes    Is patient currently taking any antibiotics for any medical conditions? N/a   If patient is taking antibiotics, did you get verbal okay from provider to give BCG? N/a    Does patient have latex allergy? No  Does patient have shellfish or betadine allergy? No      Following Elena Royal PA-C plan of care. After insuring patient does not have any symptoms of a Urinary Tract Infection patient is wiped with betadine solution to cleanse urethra opening. 16 Fr straight catheter is inserted without difficulty, 100ml residual obtained, and bladder is completely emptied. BCG is then mixed with sodium chloride solution using closed transfer system. Then the catheter is attached to bulb syringe and BCG is then poured into the bulb syringe, bulb syringe is capped, and BCG is slowly instilled into bladder. Patient did not have a bladder spasms during instillation . Once all the solution was through the catheter tubing was removed from urethra and discarded into yellow biohazard bag. Pt was administered 50 mg of BCG today. BCG 4 OF 6 INSTILLED WITHOUT DIFFICULTY. Bladder tumor resection site:  medium to large posterior wall tumor  Lot Number: [id number]  Expiration Date: 03/01/2024  [facility] #: [id number]    Patient instructed once they are home to lay on their right side for 10 minutes, left side for 10 minutes, on their back for 10 minutes, and then on their stomach for 10 minutes. This will ensure that the medication coats the entire bladder. This is only a recommendation not a requirement. Patient was instructed to hold urine for 2 hours and then void, place 2 cups non-diluted bleach (Clorox) into the stool and let stand for 15 minutes prior to flushing. This is to be repeated every time the patient voids for the next 6 hours. Patient is instructed to drink fluids to flush out the bladder.  Patient is instructed to call the office ([phone]) if they

## 2023-08-03 ENCOUNTER — NURSE ONLY (OUTPATIENT)
Dept: UROLOGY | Age: 73
End: 2023-08-03
Payer: OTHER GOVERNMENT

## 2023-08-03 DIAGNOSIS — C67.9 MALIGNANT NEOPLASM OF URINARY BLADDER, UNSPECIFIED SITE (HCC): Primary | ICD-10-CM

## 2023-08-03 LAB
BILIRUBIN URINE: NEGATIVE
BLOOD URINE, POC: NORMAL
CHARACTER, URINE: CLEAR
COLOR, URINE: YELLOW
GLUCOSE URINE: NEGATIVE MG/DL
KETONES, URINE: NEGATIVE
LEUKOCYTE CLUMPS, URINE: NEGATIVE
NITRITE, URINE: NEGATIVE
PH, URINE: 6 (ref 5–9)
PROTEIN, URINE: NEGATIVE MG/DL
SPECIFIC GRAVITY, URINE: >= 1.03 (ref 1–1.03)
UROBILINOGEN, URINE: 0.2 EU/DL (ref 0–1)

## 2023-08-03 PROCEDURE — 51720 TREATMENT OF BLADDER LESION: CPT | Performed by: NURSE PRACTITIONER

## 2023-08-03 PROCEDURE — 81003 URINALYSIS AUTO W/O SCOPE: CPT | Performed by: NURSE PRACTITIONER

## 2023-08-03 NOTE — PROGRESS NOTES
Patient has given me verbal consent to perform BCG  Yes    Is patient currently taking any antibiotics for any medical conditions? N/A   If patient is taking antibiotics, did you get verbal okay from provider to give BCG? N/A    Does patient have latex allergy? No  Does patient have shellfish or betadine allergy? No      Following MURALI Alexander plan of care. After insuring patient does not have any symptoms of a Urinary Tract Infection patient is wiped with betadine solution to cleanse urethra opening. 16 Fr Straight catheter is inserted without difficulty, 10ml residual obtained, and bladder is completely emptied. BCG is then mixed with sodium chloride solution using closed transfer system. Then the catheter is attached to bulb syringe and BCG is then poured into the bulb syringe, bulb syringe is capped, and BCG is slowly instilled into bladder. Patient did not have a bladder spasms during instillation. Once all the solution was through the catheter tubing was removed from urethra and discarded into yellow biohazard bag. Pt was administered 50 mg of BCG today. BCG 5 OF 5 INSTILLED WITHOUT DIFFICULTY. Bladder tumor resection site: medium to large posterior wall tumor  Lot Number: [id number]  Expiration Date: 03/01/2024  [facility] #: [id number]    Patient instructed once they are home to lay on their right side for 10 minutes, left side for 10 minutes, on their back for 10 minutes, and then on their stomach for 10 minutes. This will ensure that the medication coats the entire bladder. This is only a recommendation not a requirement. Patient was instructed to hold urine for 2 hours and then void, place 2 cups non-diluted bleach (Clorox) into the stool and let stand for 15 minutes prior to flushing. This is to be repeated every time the patient voids for the next 6 hours. Patient is instructed to drink fluids to flush out the bladder.  Patient is instructed to call the office ([phone]) if they

## 2023-08-10 ENCOUNTER — NURSE ONLY (OUTPATIENT)
Dept: UROLOGY | Age: 73
End: 2023-08-10
Payer: OTHER GOVERNMENT

## 2023-08-10 DIAGNOSIS — C67.9 MALIGNANT NEOPLASM OF URINARY BLADDER, UNSPECIFIED SITE (HCC): Primary | ICD-10-CM

## 2023-08-10 LAB
BILIRUBIN URINE: NEGATIVE
BLOOD URINE, POC: ABNORMAL
CHARACTER, URINE: CLEAR
COLOR, URINE: YELLOW
GLUCOSE URINE: NEGATIVE MG/DL
KETONES, URINE: NEGATIVE
LEUKOCYTE CLUMPS, URINE: NEGATIVE
NITRITE, URINE: NEGATIVE
PH, URINE: 5.5 (ref 5–9)
PROTEIN, URINE: ABNORMAL MG/DL
SPECIFIC GRAVITY, URINE: >= 1.03 (ref 1–1.03)
UROBILINOGEN, URINE: 1 EU/DL (ref 0–1)

## 2023-08-10 PROCEDURE — 81003 URINALYSIS AUTO W/O SCOPE: CPT

## 2023-08-10 PROCEDURE — 51720 TREATMENT OF BLADDER LESION: CPT

## 2023-08-10 RX ORDER — TAMSULOSIN HYDROCHLORIDE 0.4 MG/1
0.4 CAPSULE ORAL 2 TIMES DAILY
Qty: 180 CAPSULE | Refills: 3 | Status: SHIPPED | OUTPATIENT
Start: 2023-08-10

## 2023-08-10 NOTE — PROGRESS NOTES
Patient has given me verbal consent to perform BCG  Yes    Is patient currently taking any antibiotics for any medical conditions? N/A   If patient is taking antibiotics, did you get verbal okay from provider to give BCG? N/A    Does patient have latex allergy? No  Does patient have shellfish or betadine allergy? No      Following Sonal Petersen PA-C plan of care. After insuring patient does not have any symptoms of a Urinary Tract Infection patient is wiped with betadine solution to cleanse urethra opening. 16 Fr Straight catheter is inserted without difficulty, 25ml residual obtained, and bladder is completely emptied. BCG is then mixed with sodium chloride solution using closed transfer system. Then the catheter is attached to bulb syringe and BCG is then poured into the bulb syringe, bulb syringe is capped, and BCG is slowly instilled into bladder. Patient did not have a bladder spasms during instillation. Once all the solution was through the catheter tubing was removed from urethra and discarded into yellow biohazard bag. Pt was administered 50 mg of BCG today. BCG 6 OF 6 INSTILLED WITHOUT DIFFICULTY. Bladder tumor resection site:  medium to large posterior wall tumor  Lot Number: [id number]  Expiration Date: 03-  [facility] #: [id number]    Patient instructed once they are home to lay on their right side for 10 minutes, left side for 10 minutes, on their back for 10 minutes, and then on their stomach for 10 minutes. This will ensure that the medication coats the entire bladder. This is only a recommendation not a requirement. Patient was instructed to hold urine for 2 hours and then void, place 2 cups non-diluted bleach (Clorox) into the stool and let stand for 15 minutes prior to flushing. This is to be repeated every time the patient voids for the next 6 hours. Patient is instructed to drink fluids to flush out the bladder.  Patient is instructed to call the office ([phone]) if they

## 2023-08-10 NOTE — TELEPHONE ENCOUNTER
Brigid Larkin called requesting a refill on the following medications:    REQ 90 DAY SUPPLY WITH REFILLS  Requested Prescriptions     Pending Prescriptions Disp Refills    tamsulosin (FLOMAX) 0.4 MG capsule 60 capsule 1     Sig: Take 1 capsule by mouth in the morning and at bedtime     Pharmacy verified:  [facility]      Date of last visit: 06-01-23  Date of next visit (if applicable): 73-16-57

## 2023-08-10 NOTE — PROGRESS NOTES
I have personally verified, reviewed, and approved these actions. Will need cystoscopy 2 months following completion of BCG treatments per Dr. Candido Mckinnon note.

## 2023-10-05 ENCOUNTER — HOSPITAL ENCOUNTER (OUTPATIENT)
Dept: UROLOGY | Age: 73
Discharge: HOME | End: 2023-10-05
Payer: OTHER GOVERNMENT

## 2023-10-05 VITALS
SYSTOLIC BLOOD PRESSURE: 144 MMHG | WEIGHT: 196.7 LBS | DIASTOLIC BLOOD PRESSURE: 88 MMHG | HEIGHT: 69 IN | BODY MASS INDEX: 29.13 KG/M2 | HEART RATE: 79 BPM | TEMPERATURE: 97.8 F | RESPIRATION RATE: 16 BRPM

## 2023-10-05 LAB
BILIRUBIN URINE: NEGATIVE
BLOOD URINE, POC: NORMAL
CHARACTER, URINE: CLEAR
COLOR, URINE: YELLOW
GLUCOSE URINE: NEGATIVE MG/DL
KETONES, URINE: NEGATIVE
LEUKOCYTE CLUMPS, URINE: NEGATIVE
NITRITE, URINE: NEGATIVE
PH, URINE: 7 (ref 5–9)
PROTEIN, URINE: NEGATIVE MG/DL
SPECIFIC GRAVITY, URINE: 1.02 (ref 1–1.03)
UROBILINOGEN, URINE: 0.2 EU/DL (ref 0–1)

## 2023-10-05 PROCEDURE — 52000 CYSTOURETHROSCOPY: CPT

## 2023-10-05 NOTE — PROGRESS NOTES
[facility]  This procedure has been fully reviewed with the patient and written informed consent has been obtained. 5472 Procedure started with Dr. Shahnaz Landon Procedure completed; patient tolerated well. Dr. Danuta Foy talked to patient in length about procedure findings. Patient discharged.     PLAN     Surveillance cystoscopy scheduled 01/04/2024 at 8:15am.

## 2023-10-05 NOTE — DISCHARGE INSTRUCTIONS
Discharge instructions: Cystoscopy  You May experience painful urination and see blood in the urine after your procedure. This should resolve over time. Pt ok to discharge home in good condition  No heavy lifting, >10 lbs for today  Pt should avoid strenuous activity for today  Pt should walk moderately at home  Pt ok to shower   Pt may resume diet as tolerated  Please call attending physician or hospital  with questions  Call or Present to ED if fever (> 101F), intractable nausea vomiting or pain.     Surveillance cystoscopy scheduled 01/04/2024 at 8:15am.

## 2023-10-05 NOTE — OP NOTE
DATE:  10/5/2023  Krystle Daniels  1950  [id number]     Surgeon:  Karol Salazar MD MD  Preoperative diagnosis: hx of bladder cancer  Postoperative diagnosis: Same  Procedure: Flexible cystoscopy  Anesthesia: Local      Indications:  He is here for Cystoscopy. Risks benefits alternatives goals and possible complications of the procedure were explained to the patient and informed consent was obtained he elected to proceed. Details: The patient was brought back to the operating room. He was laid in the supine position. His genitals were prepped and draped in usual sterile surgical fashion. 2 % lidocaine jelly was placed per the urethra for pain control. Flexible cystoscope was assembled and passed per the urethra. The anterior urethra was normal without any lesions; the posterior urethra and prostate were unremarkable. The bladder was inspected thoroughly and systematically, see below. The patient tolerated the procedure well. The scope was removed intact and without any issues. This concluded the case. He was taken to recovery period and discharged home in stable condition. Plan/findings/impression   Healing posterior wall  No tumors  Moderate obstruction  On flomax BID.  Doing ok with BPH, will watch  Surveillance cysto 3 months

## 2023-12-30 ENCOUNTER — HOSPITAL ENCOUNTER (EMERGENCY)
Age: 73
Discharge: HOME | End: 2023-12-30
Payer: OTHER GOVERNMENT

## 2023-12-30 VITALS
SYSTOLIC BLOOD PRESSURE: 136 MMHG | HEART RATE: 86 BPM | BODY MASS INDEX: 28.65 KG/M2 | DIASTOLIC BLOOD PRESSURE: 72 MMHG | TEMPERATURE: 98.9 F | OXYGEN SATURATION: 95 % | WEIGHT: 194 LBS | RESPIRATION RATE: 18 BRPM

## 2023-12-30 DIAGNOSIS — G47.33 OSA ON CPAP: ICD-10-CM

## 2023-12-30 DIAGNOSIS — U07.1 COVID-19: Primary | ICD-10-CM

## 2023-12-30 PROCEDURE — 99213 OFFICE O/P EST LOW 20 MIN: CPT

## 2023-12-30 ASSESSMENT — PAIN - FUNCTIONAL ASSESSMENT: PAIN_FUNCTIONAL_ASSESSMENT: NONE - DENIES PAIN

## 2023-12-30 NOTE — ED TRIAGE NOTES
Patient to room with c/o nonproductive cough, head congestion, and nasal drainage beginning four days ago. Tested positive for COVID yesterday.

## 2023-12-30 NOTE — ED PROVIDER NOTES
[address]  Urgent Care Encounter      CHIEF COMPLAINT       Chief Complaint   Patient presents with    Head Congestion     Sinus pressure, cough, nasal drainage      Positive For Covid-19       Nurses Notes reviewed and I agree except as noted in the HPI. HISTORY OF PRESENT ILLNESS   Leandra King is a 74 y.o. male who presents with 2 days of COVID symptoms and tested positive at home today for COVID-19. Denies nausea vomiting diarrhea, high fever, stridor or wheezing. REVIEW OF SYSTEMS     Review of Systems   Constitutional:  Positive for activity change, appetite change, chills, diaphoresis, fatigue and fever. HENT:  Positive for congestion, rhinorrhea and sore throat. Negative for ear pain and trouble swallowing. Eyes:  Negative for pain, discharge and redness. Respiratory:  Positive for cough. Negative for shortness of breath and wheezing. Cardiovascular: Negative. Gastrointestinal:  Negative for abdominal pain, constipation, diarrhea, nausea and vomiting. Endocrine: Negative. Genitourinary:  Negative for dysuria, frequency and urgency. Musculoskeletal:  Positive for myalgias. Negative for arthralgias and back pain. Skin:  Negative for rash. Allergic/Immunologic: Negative. Neurological:  Positive for headaches. Negative for dizziness, tremors and weakness. Hematological: Negative. Psychiatric/Behavioral:  Negative for dysphoric mood and sleep disturbance. The patient is not nervous/anxious.         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

## 2024-01-17 ENCOUNTER — HOSPITAL ENCOUNTER (OUTPATIENT)
Dept: CT IMAGING | Age: 74
Discharge: HOME | End: 2024-01-17
Payer: OTHER GOVERNMENT

## 2024-01-17 DIAGNOSIS — R91.1 SOLITARY PULMONARY NODULE: ICD-10-CM

## 2024-01-17 PROCEDURE — 71250 CT THORAX DX C-: CPT

## 2024-01-18 ENCOUNTER — HOSPITAL ENCOUNTER (OUTPATIENT)
Dept: UROLOGY | Age: 74
Discharge: HOME | End: 2024-01-18
Payer: OTHER GOVERNMENT

## 2024-01-18 ENCOUNTER — TELEPHONE (OUTPATIENT)
Dept: UROLOGY | Age: 74
End: 2024-01-18

## 2024-01-18 VITALS
SYSTOLIC BLOOD PRESSURE: 119 MMHG | TEMPERATURE: 98.9 F | HEIGHT: 69 IN | RESPIRATION RATE: 16 BRPM | HEART RATE: 68 BPM | DIASTOLIC BLOOD PRESSURE: 82 MMHG | WEIGHT: 195.2 LBS | BODY MASS INDEX: 28.91 KG/M2 | OXYGEN SATURATION: 96 %

## 2024-01-18 LAB
BILIRUBIN URINE: NEGATIVE
BLOOD URINE, POC: ABNORMAL
CHARACTER, URINE: ABNORMAL
COLOR, URINE: YELLOW
GLUCOSE URINE: NEGATIVE MG/DL
KETONES, URINE: NEGATIVE
LEUKOCYTE CLUMPS, URINE: ABNORMAL
NITRITE, URINE: POSITIVE
PH, URINE: 7 (ref 5–9)
PROTEIN, URINE: NEGATIVE MG/DL
SPECIFIC GRAVITY, URINE: 1.02 (ref 1–1.03)
UROBILINOGEN, URINE: 1 EU/DL (ref 0–1)

## 2024-01-18 PROCEDURE — 87077 CULTURE AEROBIC IDENTIFY: CPT

## 2024-01-18 PROCEDURE — 87147 CULTURE TYPE IMMUNOLOGIC: CPT

## 2024-01-18 PROCEDURE — 52000 CYSTOURETHROSCOPY: CPT

## 2024-01-18 PROCEDURE — 87186 SC STD MICRODIL/AGAR DIL: CPT

## 2024-01-18 PROCEDURE — 87086 URINE CULTURE/COLONY COUNT: CPT

## 2024-01-18 RX ORDER — MIRABEGRON 50 MG/1
50 TABLET, FILM COATED, EXTENDED RELEASE ORAL DAILY
Qty: 30 TABLET | Refills: 11 | Status: SHIPPED | OUTPATIENT
Start: 2024-01-18 | End: 2024-01-18 | Stop reason: SDUPTHER

## 2024-01-18 RX ORDER — CIPROFLOXACIN 500 MG/1
500 TABLET, FILM COATED ORAL 2 TIMES DAILY
Qty: 14 TABLET | Refills: 0 | Status: SHIPPED | OUTPATIENT
Start: 2024-01-18 | End: 2024-01-25

## 2024-01-18 RX ORDER — MIRABEGRON 50 MG/1
50 TABLET, FILM COATED, EXTENDED RELEASE ORAL DAILY
Qty: 30 TABLET | Refills: 5 | Status: SHIPPED | OUTPATIENT
Start: 2024-01-18

## 2024-01-18 NOTE — TELEPHONE ENCOUNTER
[facility]. Its to expensive at the local pharmacy. Had a cystoscopy today.

## 2024-01-18 NOTE — OP NOTE
DATE:  1/18/2024  Danielito Chavarria  1950  [id number]     Surgeon:  YAKELIN LOPEZ MD MD  Preoperative diagnosis: hx of bladder cancer  Postoperative diagnosis: Same  Procedure: Flexible cystoscopy  Anesthesia: Local        Indications:  He is here for Cystoscopy.  Risks benefits alternatives goals and possible complications of the procedure were explained to the patient and informed consent was obtained he elected to proceed.     Details:   The patient was brought back to the operating room.  He was laid in the supine position.  His genitals were prepped and draped in usual sterile surgical fashion.  2 % lidocaine jelly was placed per the urethra for pain control.  Flexible cystoscope was assembled and passed per the urethra.  The anterior urethra was normal without any lesions; the posterior urethra and prostate were unremarkable.  The bladder was inspected thoroughly and systematically, see below.  The patient tolerated the procedure well.  The scope was removed intact and without any issues.  This concluded the case.  He was taken to recovery period and discharged home in stable condition.       Plan/findings/impression    No tumors today  Moderate obstruction  Incomplete bladder emptying  On flomax BID. Doing ok with BPH, will watch  Add myrbetriq for OAB  Cipro for Abx    Surveillance cysto 3 months  
Name band;

## 2024-01-18 NOTE — DISCHARGE INSTRUCTIONS
Discharge instructions: Cystoscopy  You May experience painful urination and see blood in the urine after your procedure.  This should resolve over time.      Pt ok to discharge home in good condition  No heavy lifting, >10 lbs for today  Pt should avoid strenuous activity for today  Pt should walk moderately at home  Pt ok to shower   Pt may resume diet as tolerated  Please call attending physician or hospital  with questions  Call or Present to ED if fever (> 101F), intractable nausea vomiting or pain.    Surveillance cystoscopy scheduled 04/18/2024 at 11:15am.

## 2024-01-18 NOTE — PROGRESS NOTES
[facility] in [facility] for [facility]  This procedure has been fully reviewed with the patient and written informed consent has been obtained.  1400 Procedure started with Dr. Lambert  1401 Procedure completed; patient tolerated well.  Dr. Lambert talked to patient in length about procedure findings.  Patient discharged.    PLAN     Begin taking cipro    Begin taking Myrbetriq    Urine culture sent    Surveillance cystoscopy scheduled 04/18/2024 at 11:15am.

## 2024-01-20 LAB
BACTERIA UR CULT: ABNORMAL
ORGANISM: ABNORMAL

## 2024-04-18 ENCOUNTER — HOSPITAL ENCOUNTER (OUTPATIENT)
Dept: UROLOGY | Age: 74
Discharge: HOME | End: 2024-04-18
Payer: OTHER GOVERNMENT

## 2024-04-18 VITALS
OXYGEN SATURATION: 95 % | WEIGHT: 195.6 LBS | BODY MASS INDEX: 28.97 KG/M2 | RESPIRATION RATE: 16 BRPM | SYSTOLIC BLOOD PRESSURE: 131 MMHG | HEIGHT: 69 IN | DIASTOLIC BLOOD PRESSURE: 76 MMHG | TEMPERATURE: 98.1 F | HEART RATE: 74 BPM

## 2024-04-18 LAB
BILIRUBIN URINE: NEGATIVE
BLOOD URINE, POC: NORMAL
CHARACTER, URINE: CLEAR
COLOR, URINE: YELLOW
GLUCOSE URINE: NEGATIVE MG/DL
KETONES, URINE: NEGATIVE
LEUKOCYTE CLUMPS, URINE: NEGATIVE
NITRITE, URINE: NEGATIVE
PH, URINE: 6.5 (ref 5–9)
PROTEIN, URINE: NEGATIVE MG/DL
SPECIFIC GRAVITY, URINE: 1.02 (ref 1–1.03)
UROBILINOGEN, URINE: 0.2 EU/DL (ref 0–1)

## 2024-04-18 PROCEDURE — 52000 CYSTOURETHROSCOPY: CPT

## 2024-04-18 NOTE — OP NOTE
DATE:  4/18/2024  Danielito Chavarria  1950  [id number]     Surgeon:  YAKELIN LOPEZ MD MD  Preoperative diagnosis: hx of bladder cancer, HG  Postoperative diagnosis: Same  Procedure: Flexible cystoscopy  Anesthesia: Local        Indications:  He is here for Cystoscopy.  Risks benefits alternatives goals and possible complications of the procedure were explained to the patient and informed consent was obtained he elected to proceed.     Details:   The patient was brought back to the operating room.  He was laid in the supine position.  His genitals were prepped and draped in usual sterile surgical fashion.  2 % lidocaine jelly was placed per the urethra for pain control.  Flexible cystoscope was assembled and passed per the urethra.  The anterior urethra was normal without any lesions; the posterior urethra and prostate were unremarkable.  The bladder was inspected thoroughly and systematically, see below.  The patient tolerated the procedure well.  The scope was removed intact and without any issues.  This concluded the case.  He was taken to recovery period and discharged home in stable condition.       Plan/findings/impression     No tumors today  Moderate obstruction  Incomplete bladder emptying  On flomax BID. Doing ok with BPH, will watch  Doing well with myrbetriq for OAB       Surveillance cysto 3 months

## 2024-04-18 NOTE — DISCHARGE INSTRUCTIONS
Discharge instructions: Cystoscopy  You May experience painful urination and see blood in the urine after your procedure.  This should resolve over time.      Pt ok to discharge home in good condition  No heavy lifting, >10 lbs for today  Pt should avoid strenuous activity for today  Pt should walk moderately at home  Pt ok to shower   Pt may resume diet as tolerated  Please call attending physician or hospital  with questions  Call or Present to ED if fever (> 101F), intractable nausea vomiting or pain.    Surveillance cystoscopy scheduled 07/18/2024 at 8:15am

## 2024-04-18 NOTE — PROGRESS NOTES
[facility]  This procedure has been fully reviewed with the patient and written informed consent has been obtained.  0917 Procedure started with Dr. Lambert  0918 Procedure completed; patient tolerated well.  Dr. Lambert talked to patient in length about procedure findings.  Patient discharged.    PLAN     Surveillance cystoscopy scheduled 07/18/2024 at 8:15am

## 2024-05-20 ENCOUNTER — HOSPITAL ENCOUNTER (EMERGENCY)
Age: 74
Discharge: HOME | End: 2024-05-20
Payer: OTHER GOVERNMENT

## 2024-05-20 VITALS
WEIGHT: 191 LBS | HEART RATE: 83 BPM | HEIGHT: 69 IN | OXYGEN SATURATION: 96 % | DIASTOLIC BLOOD PRESSURE: 76 MMHG | BODY MASS INDEX: 28.29 KG/M2 | TEMPERATURE: 97.8 F | RESPIRATION RATE: 85 BRPM | SYSTOLIC BLOOD PRESSURE: 124 MMHG

## 2024-05-20 DIAGNOSIS — M79.675 GREAT TOE PAIN, LEFT: Primary | ICD-10-CM

## 2024-05-20 PROCEDURE — 96372 THER/PROPH/DIAG INJ SC/IM: CPT

## 2024-05-20 PROCEDURE — 99213 OFFICE O/P EST LOW 20 MIN: CPT

## 2024-05-20 PROCEDURE — 6360000002 HC RX W HCPCS

## 2024-05-20 RX ORDER — METHYLPREDNISOLONE ACETATE 40 MG/ML
40 INJECTION, SUSPENSION INTRA-ARTICULAR; INTRALESIONAL; INTRAMUSCULAR; SOFT TISSUE ONCE
Status: COMPLETED | OUTPATIENT
Start: 2024-05-20 | End: 2024-05-20

## 2024-05-20 RX ORDER — ACETAMINOPHEN 500 MG
500 TABLET ORAL EVERY 6 HOURS PRN
COMMUNITY

## 2024-05-20 RX ADMIN — METHYLPREDNISOLONE ACETATE 40 MG: 40 INJECTION, SUSPENSION INTRA-ARTICULAR; INTRALESIONAL; INTRAMUSCULAR; INTRASYNOVIAL; SOFT TISSUE at 10:52

## 2024-05-20 ASSESSMENT — PAIN DESCRIPTION - LOCATION: LOCATION: FOOT

## 2024-05-20 ASSESSMENT — PAIN DESCRIPTION - FREQUENCY: FREQUENCY: INTERMITTENT

## 2024-05-20 ASSESSMENT — PAIN DESCRIPTION - PAIN TYPE: TYPE: ACUTE PAIN

## 2024-05-20 ASSESSMENT — LIFESTYLE VARIABLES
HOW OFTEN DO YOU HAVE A DRINK CONTAINING ALCOHOL: PATIENT DECLINED
HOW MANY STANDARD DRINKS CONTAINING ALCOHOL DO YOU HAVE ON A TYPICAL DAY: PATIENT DECLINED

## 2024-05-20 ASSESSMENT — PAIN DESCRIPTION - ORIENTATION: ORIENTATION: LEFT;OUTER

## 2024-05-20 ASSESSMENT — PAIN SCALES - GENERAL: PAINLEVEL_OUTOF10: 8

## 2024-05-20 ASSESSMENT — PAIN - FUNCTIONAL ASSESSMENT: PAIN_FUNCTIONAL_ASSESSMENT: 0-10

## 2024-05-20 ASSESSMENT — PAIN DESCRIPTION - ONSET: ONSET: ON-GOING

## 2024-05-20 ASSESSMENT — PAIN DESCRIPTION - DESCRIPTORS: DESCRIPTORS: TENDER

## 2024-05-20 NOTE — ED PROVIDER NOTES
[facility] URGENT CARE  Urgent Care Encounter       CHIEF COMPLAINT       Chief Complaint   Patient presents with    Bunions     left       Nurses Notes reviewed and I agree except as noted in the HPI.  HISTORY OF PRESENT ILLNESS   Danielito Chavarria is a 73 y.o. male who presents with concerns of left foot pain that has been ongoing for for 4-5 days, and feels worsening today. Reports history of bunions bilaterally. Reports has been treating with ice and Tylenol.     HPI    REVIEW OF SYSTEMS     Review of Systems   Musculoskeletal:         Left foot pain, great toe.    All other systems reviewed and are negative.      PAST MEDICAL HISTORY         Diagnosis Date    Arthritis     BPH (benign prostatic hyperplasia)     CAD (coronary artery disease)     Cataract     Diverticulitis     Diverticulosis     sigmoid    Glaucoma     History of colonic polyps     History of Helicobacter pylori infection     1990    Hyperlipidemia     Internal hemorrhoid     Ocular myasthenia gravis (HCC)     OS    Tattoo     Tinnitus     Unspecified sleep apnea        SURGICALHISTORY     Patient  has a past surgical history that includes eye surgery; back surgery; meniscectomy; Upper gastrointestinal endoscopy (1990); Cataract removal with implant (Bilateral, 2005); Colonoscopy (2013, 2016); Cardiac catheterization (1999); sinus surgery (1972); and Cystoscopy (N/A, 5/23/2023).    CURRENT MEDICATIONS       Previous Medications    ACETAMINOPHEN (TYLENOL) 500 MG TABLET    Take 1 tablet by mouth every 6 hours as needed for Pain    APOAEQUORIN (PREVAGEN PO)    Take 1 capsule by mouth daily    ASPIRIN 81 MG EC TABLET    Take 1 tablet by mouth daily    CPAP MACHINE MISC    by Does not apply route Please change CPAP pressure to auto 6-20 cm H20.    DOCUSATE SODIUM (COLACE) 100 MG CAPSULE    Take 1 capsule by mouth 2 times daily as needed for Constipation    FLUTICASONE (FLONASE) 50 MCG/ACT NASAL SPRAY    2 sprays by Nasal route daily

## 2024-05-20 NOTE — ED NOTES
Pt presents to  for c/o left foot pain. Pt states he has a bunion that has been bothering him lately      Garcia Ramirez, MENDEL  05/20/24 1042

## 2024-05-20 NOTE — DISCHARGE INSTRUCTIONS
Rotate heat and ice  Elevate  Wide fitting shoes, avoid compression.   Increase water intake  Ibuprofen as needed for fever and or pain.  Follow up with PCP in 3-5 days if no improvement or sooner with worsening symptoms.      FAMILY HISTORY:  No pertinent family history in first degree relatives

## 2024-07-18 ENCOUNTER — HOSPITAL ENCOUNTER (OUTPATIENT)
Dept: UROLOGY | Age: 74
Discharge: HOME | End: 2024-07-18
Payer: OTHER GOVERNMENT

## 2024-07-18 VITALS
RESPIRATION RATE: 16 BRPM | SYSTOLIC BLOOD PRESSURE: 118 MMHG | OXYGEN SATURATION: 96 % | HEART RATE: 68 BPM | TEMPERATURE: 98.5 F | HEIGHT: 69 IN | BODY MASS INDEX: 28.73 KG/M2 | WEIGHT: 194 LBS | DIASTOLIC BLOOD PRESSURE: 74 MMHG

## 2024-07-18 LAB
BILIRUBIN, URINE: NEGATIVE
BLOOD URINE, POC: NEGATIVE
CHARACTER, URINE: CLEAR
COLOR, UA: YELLOW
GLUCOSE URINE: NEGATIVE MG/DL
KETONES, URINE: NEGATIVE
LEUKOCYTE CLUMPS, URINE: NEGATIVE
NITRITE, URINE: NEGATIVE
PH, URINE: 5.5 (ref 5–9)
PROTEIN, URINE: NEGATIVE MG/DL
SPECIFIC GRAVITY UA: 1.02 (ref 1–1.03)
UROBILINOGEN, URINE: 1 EU/DL (ref 0–1)

## 2024-07-18 PROCEDURE — 52000 CYSTOURETHROSCOPY: CPT

## 2024-07-18 NOTE — DISCHARGE INSTRUCTIONS
Discharge instructions: Cystoscopy  You May experience painful urination and see blood in the urine after your procedure.  This should resolve over time.      Pt ok to discharge home in good condition  No heavy lifting, >10 lbs for today  Pt should avoid strenuous activity for today  Pt should walk moderately at home  Pt ok to shower   Pt may resume diet as tolerated  Please call attending physician or hospital  with questions  Call or Present to ED if fever (> 101F), intractable nausea vomiting or pain.    Surveillance cystoscopy scheduled 11/07/2024 at 8:15am.

## 2024-07-18 NOTE — OP NOTE
DATE:  7/18/2024  Danielito Chavarria  1950  [id number]     Surgeon:  YAKELIN LOPEZ MD MD  Preoperative diagnosis: hx of bladder cancer, HG  Postoperative diagnosis: Same  Procedure: Flexible cystoscopy  Anesthesia: Local        Indications:  He is here for Cystoscopy.  Risks benefits alternatives goals and possible complications of the procedure were explained to the patient and informed consent was obtained he elected to proceed.     Details:   The patient was brought back to the operating room.  He was laid in the supine position.  His genitals were prepped and draped in usual sterile surgical fashion.  2 % lidocaine jelly was placed per the urethra for pain control.  Flexible cystoscope was assembled and passed per the urethra.  The anterior urethra was normal without any lesions; the posterior urethra and prostate were unremarkable.  The bladder was inspected thoroughly and systematically, see below.  The patient tolerated the procedure well.  The scope was removed intact and without any issues.  This concluded the case.  He was taken to recovery period and discharged home in stable condition.       Plan/findings/impression     No tumors today  Moderate obstruction  Incomplete bladder emptying  On flomax BID. Doing ok with BPH, will watch  Doing well with myrbetriq for OAB        Surveillance cysto 3 months x 1 year (7/2025), then can go to q6

## 2024-07-18 NOTE — PROGRESS NOTES
[facility]  This procedure has been fully reviewed with the patient and written informed consent has been obtained.  0858 Procedure started with Dr. Lamebrt  0859 Procedure completed; patient tolerated well.  Dr. Lambert talked to patient in length about procedure findings.  Patient discharged.    PLAN     Surveillance cystoscopy scheduled 11/07/2024 at 8:15am.

## 2024-11-07 ENCOUNTER — HOSPITAL ENCOUNTER (OUTPATIENT)
Dept: UROLOGY | Age: 74
Discharge: HOME | End: 2024-11-07
Payer: OTHER GOVERNMENT

## 2024-11-07 VITALS
HEIGHT: 70 IN | WEIGHT: 195.6 LBS | TEMPERATURE: 98.6 F | SYSTOLIC BLOOD PRESSURE: 129 MMHG | HEART RATE: 79 BPM | RESPIRATION RATE: 16 BRPM | DIASTOLIC BLOOD PRESSURE: 84 MMHG | BODY MASS INDEX: 28 KG/M2 | OXYGEN SATURATION: 94 %

## 2024-11-07 LAB
BILIRUBIN, URINE: ABNORMAL
BLOOD URINE, POC: ABNORMAL
CHARACTER, URINE: CLEAR
COLOR, UA: YELLOW
GLUCOSE URINE: NEGATIVE MG/DL
KETONES, URINE: NEGATIVE
LEUKOCYTE CLUMPS, URINE: NEGATIVE
NITRITE, URINE: NEGATIVE
PH, URINE: 6.5 (ref 5–9)
PROTEIN, URINE: NEGATIVE MG/DL
SPECIFIC GRAVITY UA: 1.02 (ref 1–1.03)
UROBILINOGEN, URINE: 1 EU/DL (ref 0–1)

## 2024-11-07 PROCEDURE — 52000 CYSTOURETHROSCOPY: CPT

## 2024-11-07 RX ORDER — ALLOPURINOL 100 MG/1
100 TABLET ORAL DAILY
COMMUNITY

## 2024-11-07 NOTE — DISCHARGE INSTRUCTIONS
Discharge instructions: Cystoscopy  You May experience painful urination and see blood in the urine after your procedure.  This should resolve over time.      Pt ok to discharge home in good condition  No heavy lifting, >10 lbs for today  Pt should avoid strenuous activity for today  Pt should walk moderately at home  Pt ok to shower   Pt may resume diet as tolerated  Please call attending physician or hospital  with questions  Call or Present to ED if fever (> 101F), intractable nausea vomiting or pain.    Greenlight.  Office will call to schedule.

## 2024-11-07 NOTE — PROGRESS NOTES
[facility]  This procedure has been fully reviewed with the patient and written informed consent has been obtained.  0900 Procedure started with Dr. Lambert  0901 Procedure completed; patient tolerated well.  Dr. Lambert talked to patient in length about procedure findings.  Patient discharged.    PLAN     Greenlight.  Office will call to schedule.

## 2024-11-07 NOTE — OP NOTE
DATE:  11/7/2024  Danielito Chavarria  1950  [id number]     Surgeon:  YAKELIN LOPEZ MD MD  Preoperative diagnosis: hx of bladder cancer, HG  Postoperative diagnosis: Same  Procedure: Flexible cystoscopy  Anesthesia: Local        Indications:  He is here for Cystoscopy.  Risks benefits alternatives goals and possible complications of the procedure were explained to the patient and informed consent was obtained he elected to proceed.     Details:   The patient was brought back to the operating room.  He was laid in the supine position.  His genitals were prepped and draped in usual sterile surgical fashion.  2 % lidocaine jelly was placed per the urethra for pain control.  Flexible cystoscope was assembled and passed per the urethra.  The anterior urethra was normal without any lesions; the posterior urethra and prostate were unremarkable.  The bladder was inspected thoroughly and systematically, see below.  The patient tolerated the procedure well.  The scope was removed intact and without any issues.  This concluded the case.  He was taken to recovery period and discharged home in stable condition.       Plan/findings/impression     No tumors today  prostate obstruction  Incomplete bladder emptying and slow stream  On flomax BID. He would like to proceed with greenlight PVP      Risks: I discussed all the risks, benefits, alternatives and possible complications or surgery as well as expectations and post-op recovery.            Surveillance cysto 3 months x 1 year (7/2025), then can go to q6

## 2024-11-12 ENCOUNTER — TELEPHONE (OUTPATIENT)
Dept: UROLOGY | Age: 74
End: 2024-11-12

## 2024-11-12 DIAGNOSIS — N40.0 BENIGN PROSTATIC HYPERPLASIA, UNSPECIFIED WHETHER LOWER URINARY TRACT SYMPTOMS PRESENT: Primary | ICD-10-CM

## 2024-11-12 DIAGNOSIS — Z01.818 PRE-OP TESTING: ICD-10-CM

## 2024-11-12 DIAGNOSIS — N40.0 BENIGN LOCALIZED HYPERPLASIA OF PROSTATE: ICD-10-CM

## 2024-11-12 NOTE — TELEPHONE ENCOUNTER
Patient is scheduled for surgery with  on 12/26/24. Surgery consent to be done on arrival.Patient states he/she does not follow with a cardiologist. Patient to do pre op ekg and fasting labs now Pre op urine culture on 12/12/24. Surgery instructions gone over with patient verbally in the office or mailed to the patient.     For Moshe # [id number] per Amarilys    Patient informed an adult over the age of 18 must be with them at the time of surgery and upon discharge

## 2024-11-12 NOTE — TELEPHONE ENCOUNTER
DO NOT TAKE  FISH OIL, MOBIC, IBUPROFEN, MOTRIN-LIKE DRUGS AND ANY MULTIVITAMINS OR OVER THE COUNTER SUPPLEMENTS 14 DAYS PRIOR TO SURGERY.    HOLD ASPIRIN 5 DAYS PRIOR TO SURGERY    IF YOU TAKE GLUCOPHAGE, TRADJENTA, METFORMIN OR JANUMET, HOLD 2 DAYS PRIOR TO SURGERY    MUST HAVE AN ADULT OVER THE AGE OF 18 WITH YOU AT THE TIME OF THE DISCHARGE         Danielito WHITE Deon 1950     Surgical Physician: Dr. Lambert      You have been scheduled for the procedure marked below:      Surgery: Cystoscopy with Greenlight Photovaporization of the Prostate         Date: 12/26/24     Anesthesia:  General     Place of Service: [facility] --Second Floor Same Day Surgery         Arrive to same day surgery at:  5:30 am  (Surgery time is subject to change)      INSTRUCTIONS AS MARKED BELOW:    1.  DO NOT eat or drink anything after midnight before surgery.   2.  We prefer you shower or bathe with an antibacterial soap (Dial) the morning of surgery.  3  Please bring a current medication list, photo ID and insurance card(s) with you  4. Okay to take Tylenol  5. Take blood pressure or heart medication as directed, if taken in the morning take with a small sip of water  6.The office will call you in 1-2 days after your procedure to schedule a follow up.    DATE SENSITIVE PRE OP TESTING:    TO AVOID YOUR SURGERY BEING CANCELLED DO ON THE DATE LISTED *WALK IN *NO APPOINTMENT.      DO THE PRE OP FASTING LABS AND EKG NOW. ORDERS INCLUDED    DO THE PRE OP URINE CULTURE ON 12/12/24. ORDER INCLUDED        Date: 11/12/2024

## 2024-11-18 ENCOUNTER — HOSPITAL ENCOUNTER (OUTPATIENT)
Age: 74
Discharge: HOME | End: 2024-11-18
Payer: OTHER GOVERNMENT

## 2024-11-18 DIAGNOSIS — N40.0 BENIGN PROSTATIC HYPERPLASIA, UNSPECIFIED WHETHER LOWER URINARY TRACT SYMPTOMS PRESENT: ICD-10-CM

## 2024-11-18 DIAGNOSIS — Z01.818 PRE-OP TESTING: ICD-10-CM

## 2024-11-18 LAB
ANION GAP SERPL CALC-SCNC: 9 MEQ/L (ref 8–16)
BASOPHILS ABSOLUTE: 0.1 THOU/MM3 (ref 0–0.1)
BASOPHILS NFR BLD AUTO: 1 %
BUN SERPL-MCNC: 11 MG/DL (ref 7–22)
CALCIUM SERPL-MCNC: 9.5 MG/DL (ref 8.5–10.5)
CHLORIDE SERPL-SCNC: 103 MEQ/L (ref 98–111)
CO2 SERPL-SCNC: 28 MEQ/L (ref 23–33)
CREAT SERPL-MCNC: 0.8 MG/DL (ref 0.4–1.2)
DEPRECATED RDW RBC AUTO: 44.2 FL (ref 35–45)
EKG ATRIAL RATE: 79 BPM
EKG P AXIS: 55 DEGREES
EKG P-R INTERVAL: 182 MS
EKG Q-T INTERVAL: 368 MS
EKG QRS DURATION: 98 MS
EKG QTC CALCULATION (BAZETT): 421 MS
EKG R AXIS: 55 DEGREES
EKG T AXIS: 51 DEGREES
EKG VENTRICULAR RATE: 79 BPM
EOSINOPHIL NFR BLD AUTO: 3.4 %
EOSINOPHILS ABSOLUTE: 0.2 THOU/MM3 (ref 0–0.4)
ERYTHROCYTE [DISTWIDTH] IN BLOOD BY AUTOMATED COUNT: 13.7 % (ref 11.5–14.5)
GFR SERPL CREATININE-BSD FRML MDRD: > 90 ML/MIN/1.73M2
GLUCOSE SERPL-MCNC: 140 MG/DL (ref 70–108)
HCT VFR BLD AUTO: 49 % (ref 42–52)
HGB BLD-MCNC: 16.4 GM/DL (ref 14–18)
IMM GRANULOCYTES # BLD AUTO: 0.04 THOU/MM3 (ref 0–0.07)
IMM GRANULOCYTES NFR BLD AUTO: 0.6 %
LYMPHOCYTES ABSOLUTE: 2.1 THOU/MM3 (ref 1–4.8)
LYMPHOCYTES NFR BLD AUTO: 33.8 %
MCH RBC QN AUTO: 29.9 PG (ref 26–33)
MCHC RBC AUTO-ENTMCNC: 33.5 GM/DL (ref 32.2–35.5)
MCV RBC AUTO: 89.3 FL (ref 80–94)
MONOCYTES ABSOLUTE: 0.6 THOU/MM3 (ref 0.4–1.3)
MONOCYTES NFR BLD AUTO: 10.3 %
NEUTROPHILS ABSOLUTE: 3.2 THOU/MM3 (ref 1.8–7.7)
NEUTROPHILS NFR BLD AUTO: 50.9 %
NRBC BLD AUTO-RTO: 0 /100 WBC
PLATELET # BLD AUTO: 191 THOU/MM3 (ref 130–400)
PMV BLD AUTO: 10 FL (ref 9.4–12.4)
POTASSIUM SERPL-SCNC: 4.2 MEQ/L (ref 3.5–5.2)
RBC # BLD AUTO: 5.49 MILL/MM3 (ref 4.7–6.1)
SODIUM SERPL-SCNC: 140 MEQ/L (ref 135–145)
WBC # BLD AUTO: 6.2 THOU/MM3 (ref 4.8–10.8)

## 2024-11-18 PROCEDURE — 36415 COLL VENOUS BLD VENIPUNCTURE: CPT

## 2024-11-18 PROCEDURE — 93010 ELECTROCARDIOGRAM REPORT: CPT | Performed by: INTERNAL MEDICINE

## 2024-11-18 PROCEDURE — 93005 ELECTROCARDIOGRAM TRACING: CPT

## 2024-11-18 PROCEDURE — 85025 COMPLETE CBC W/AUTO DIFF WBC: CPT

## 2024-11-18 PROCEDURE — 80048 BASIC METABOLIC PNL TOTAL CA: CPT

## 2024-12-12 ENCOUNTER — HOSPITAL ENCOUNTER (OUTPATIENT)
Age: 74
Discharge: HOME | End: 2024-12-12
Payer: OTHER GOVERNMENT

## 2024-12-12 DIAGNOSIS — N40.0 BENIGN PROSTATIC HYPERPLASIA, UNSPECIFIED WHETHER LOWER URINARY TRACT SYMPTOMS PRESENT: ICD-10-CM

## 2024-12-12 DIAGNOSIS — Z01.818 PRE-OP TESTING: ICD-10-CM

## 2024-12-12 PROCEDURE — 87086 URINE CULTURE/COLONY COUNT: CPT

## 2024-12-13 LAB
BACTERIA UR CULT: ABNORMAL
ORGANISM: ABNORMAL

## 2024-12-16 ENCOUNTER — PREP FOR PROCEDURE (OUTPATIENT)
Dept: UROLOGY | Age: 74
End: 2024-12-16

## 2024-12-16 NOTE — PROGRESS NOTES
PAT Call Date: 12/16   Surgery Date: 12/26    Surgeon: Petra   Surgery: greenlight prostate    Any Isolation Precautions? No      Type of Isolation Precaution: Not Applicable   Is patient from a nursing home? No  Name of Nursing Home:   Any equipment assist needed for moving patient? No   Type of Equipment: Not Applicable  Patient last weight: 196 lb     Hard Copy on Chart  In EPIC Pending/Notes   Consent -   Within 30 days; signed, dated & timed by patient and physician     [x] On Arrival     [] Blood      [] DNR   H&P -   Within 30 days    [] Physician To Do     Clearance -      []Medical     []Cardiac     [] Pulmonary    Orders -   Signed and Dated    12/16  [] Physician To Do    Labs -   Within 3 months   11/18 12/12  [x] CBC-ok    [x] BMP-ok   [x] GFR-ok   [] INR    [] PTT    [x] Urine -ok   [] Liver Enzymes    [] MRSA Nasal      Others:     Radiology Studies -   Within 1 year    [] Chest X-Ray   [] MRI    [] CT    [] Vascular   [] US     Pulmonary -     [x] ELINOR   [x] CPAP     Cardiac Workup -   Stress Test, Echo, Cath within 18 months  11/18  [x] EKG   -ok   [] Cath                 [] Stress Test                      [] Echo/PAMELA   [] CABG   [] Holter Monitor      [] Pacemaker/ICD        Brand:        Where does patient have checked:         Last check:         Rep Notified:

## 2024-12-16 NOTE — PROGRESS NOTES
Follow all instructions given by your physician and call [phone] morning of the 24th to receive arrival time for surgery  Do not eat or drink anything after midnight prior to surgery(includes water, chewing gum, mints and ice chips)  Sips of water am of surgery with allowed medications  May brush teeth   Do not smoke or chew tobacco, drink alcoholic beverages or use any illicit drugs for 24 hours prior to surgery  Bring insurance info and photo ID  Bring pertinent paperwork with you from Doctor or surgeons's office  Wear clean comfortable, loose-fitting clothing  No make-up, nail polish, jewelry, piercings, or contact lenses to be worn day of surgery  No glue on dentures morning of surgery; you will be asked to remove them for surgery. Case for glasses.  Shower the night before and the morning of surgery with cleansing soap provided or a liquid antibacterial soap, dry with new fresh clean towel after each shower, no lotions, creams or powder.  Clean sheets and pillowcase on bed night before surgery  Bring medications in original bottles, Bring rescue inhalers with you  Bring CPAP/BIPAP machine if you have one ( you may be charged if one is needed in recovery room )    Do you have a DNR? no  Please Bring Healthcare Directive or Healthcare Power of  in so we can scan it into your chart.    Our pharmacy has a Meds to Beds program where they will deliver any new prescriptions you may have to your room before you leave. Our Pharmacy will clear it through your insurance; for example (same co pay). This enables you to take your new RX as soon as you need when you get home and avoids stop/wait delays on the way home.  Please have a form of payment with you and have someone designated as your Pharmacy contact with their phone # as you may not feel well or still be under the influence of anesthesia.    Please refer to the SSI-Surgical Site Infection Flyer you hopefully received in the mail-together we can prevent  infections; signs and symptoms reviewed.  When discharged be sure you understand how to care for your wound and that you have the Dr./office phone # to call if you have any concerns or questions about your wound.     needed at discharge and someone over 18 to stay with you for 24 hours overnight (surgery may be cancelled if you don't have this)  Report to [facility] on 2nd floor  If you would become ill prior to surgery, please call the surgeon  May have a visitor with you, we request that you limit to 2 visitors in pre-op area  Masks are recommended but not required, new masks at entrance desk  Call [phone] for any questions

## 2024-12-17 RX ORDER — SODIUM CHLORIDE 9 MG/ML
INJECTION, SOLUTION INTRAVENOUS PRN
Status: CANCELLED | OUTPATIENT
Start: 2024-12-17

## 2024-12-17 RX ORDER — SODIUM CHLORIDE 0.9 % (FLUSH) 0.9 %
5-40 SYRINGE (ML) INJECTION EVERY 12 HOURS SCHEDULED
Status: CANCELLED | OUTPATIENT
Start: 2024-12-17

## 2024-12-17 RX ORDER — SODIUM CHLORIDE 0.9 % (FLUSH) 0.9 %
5-40 SYRINGE (ML) INJECTION PRN
Status: CANCELLED | OUTPATIENT
Start: 2024-12-17

## 2024-12-26 ENCOUNTER — HOSPITAL ENCOUNTER (OUTPATIENT)
Age: 74
Setting detail: OUTPATIENT SURGERY
Discharge: HOME | End: 2024-12-26
Attending: UROLOGY | Admitting: UROLOGY
Payer: OTHER GOVERNMENT

## 2024-12-26 ENCOUNTER — ANESTHESIA (OUTPATIENT)
Dept: OPERATING ROOM | Age: 74
End: 2024-12-26
Payer: OTHER GOVERNMENT

## 2024-12-26 ENCOUNTER — ANESTHESIA EVENT (OUTPATIENT)
Dept: OPERATING ROOM | Age: 74
End: 2024-12-26
Payer: OTHER GOVERNMENT

## 2024-12-26 VITALS
SYSTOLIC BLOOD PRESSURE: 135 MMHG | BODY MASS INDEX: 28.14 KG/M2 | TEMPERATURE: 97 F | HEIGHT: 69 IN | WEIGHT: 190 LBS | DIASTOLIC BLOOD PRESSURE: 74 MMHG | HEART RATE: 74 BPM | RESPIRATION RATE: 18 BRPM | OXYGEN SATURATION: 94 %

## 2024-12-26 PROCEDURE — 2709999900 HC NON-CHARGEABLE SUPPLY: Performed by: UROLOGY

## 2024-12-26 PROCEDURE — 3700000001 HC ADD 15 MINUTES (ANESTHESIA): Performed by: UROLOGY

## 2024-12-26 PROCEDURE — 3600000003 HC SURGERY LEVEL 3 BASE: Performed by: UROLOGY

## 2024-12-26 PROCEDURE — 2580000003 HC RX 258: Performed by: UROLOGY

## 2024-12-26 PROCEDURE — 7100000000 HC PACU RECOVERY - FIRST 15 MIN: Performed by: UROLOGY

## 2024-12-26 PROCEDURE — 3700000000 HC ANESTHESIA ATTENDED CARE: Performed by: UROLOGY

## 2024-12-26 PROCEDURE — 6360000002 HC RX W HCPCS: Performed by: UROLOGY

## 2024-12-26 PROCEDURE — 3600000013 HC SURGERY LEVEL 3 ADDTL 15MIN: Performed by: UROLOGY

## 2024-12-26 PROCEDURE — 7100000001 HC PACU RECOVERY - ADDTL 15 MIN: Performed by: UROLOGY

## 2024-12-26 PROCEDURE — 7100000011 HC PHASE II RECOVERY - ADDTL 15 MIN: Performed by: UROLOGY

## 2024-12-26 PROCEDURE — 2500000003 HC RX 250 WO HCPCS: Performed by: UROLOGY

## 2024-12-26 PROCEDURE — 7100000010 HC PHASE II RECOVERY - FIRST 15 MIN: Performed by: UROLOGY

## 2024-12-26 PROCEDURE — 2720000010 HC SURG SUPPLY STERILE: Performed by: UROLOGY

## 2024-12-26 PROCEDURE — 6360000002 HC RX W HCPCS: Performed by: NURSE ANESTHETIST, CERTIFIED REGISTERED

## 2024-12-26 PROCEDURE — 2500000003 HC RX 250 WO HCPCS: Performed by: NURSE ANESTHETIST, CERTIFIED REGISTERED

## 2024-12-26 RX ORDER — PHENYLEPHRINE HCL IN 0.9% NACL 1 MG/10 ML
SYRINGE (ML) INTRAVENOUS
Status: DISCONTINUED | OUTPATIENT
Start: 2024-12-26 | End: 2024-12-26 | Stop reason: SDUPTHER

## 2024-12-26 RX ORDER — NALOXONE HYDROCHLORIDE 0.4 MG/ML
INJECTION, SOLUTION INTRAMUSCULAR; INTRAVENOUS; SUBCUTANEOUS PRN
Status: DISCONTINUED | OUTPATIENT
Start: 2024-12-26 | End: 2024-12-26 | Stop reason: HOSPADM

## 2024-12-26 RX ORDER — FENTANYL CITRATE 50 UG/ML
50 INJECTION, SOLUTION INTRAMUSCULAR; INTRAVENOUS EVERY 5 MIN PRN
Status: DISCONTINUED | OUTPATIENT
Start: 2024-12-26 | End: 2024-12-26 | Stop reason: HOSPADM

## 2024-12-26 RX ORDER — SODIUM CHLORIDE 0.9 % (FLUSH) 0.9 %
5-40 SYRINGE (ML) INJECTION EVERY 12 HOURS SCHEDULED
Status: DISCONTINUED | OUTPATIENT
Start: 2024-12-26 | End: 2024-12-26 | Stop reason: HOSPADM

## 2024-12-26 RX ORDER — TRIAMCINOLONE ACETONIDE 1 MG/ML
1 LOTION TOPICAL 3 TIMES DAILY
COMMUNITY

## 2024-12-26 RX ORDER — SODIUM CHLORIDE 9 MG/ML
INJECTION, SOLUTION INTRAVENOUS PRN
Status: DISCONTINUED | OUTPATIENT
Start: 2024-12-26 | End: 2024-12-26 | Stop reason: HOSPADM

## 2024-12-26 RX ORDER — PHENAZOPYRIDINE HYDROCHLORIDE 200 MG/1
200 TABLET, FILM COATED ORAL 3 TIMES DAILY PRN
Qty: 9 TABLET | Refills: 0 | Status: SHIPPED | OUTPATIENT
Start: 2024-12-26

## 2024-12-26 RX ORDER — CIPROFLOXACIN 500 MG/1
500 TABLET, FILM COATED ORAL 2 TIMES DAILY
Qty: 6 TABLET | Refills: 0 | Status: SHIPPED | OUTPATIENT
Start: 2024-12-26 | End: 2024-12-29

## 2024-12-26 RX ORDER — EPHEDRINE SULFATE/0.9% NACL/PF 25 MG/5 ML
SYRINGE (ML) INTRAVENOUS
Status: DISCONTINUED | OUTPATIENT
Start: 2024-12-26 | End: 2024-12-26 | Stop reason: SDUPTHER

## 2024-12-26 RX ORDER — DEXAMETHASONE SODIUM PHOSPHATE 10 MG/ML
INJECTION, EMULSION INTRAMUSCULAR; INTRAVENOUS
Status: DISCONTINUED | OUTPATIENT
Start: 2024-12-26 | End: 2024-12-26 | Stop reason: SDUPTHER

## 2024-12-26 RX ORDER — LIDOCAINE HCL/PF 100 MG/5ML
SYRINGE (ML) INJECTION
Status: DISCONTINUED | OUTPATIENT
Start: 2024-12-26 | End: 2024-12-26 | Stop reason: SDUPTHER

## 2024-12-26 RX ORDER — DIPHENHYDRAMINE HYDROCHLORIDE 50 MG/ML
12.5 INJECTION INTRAMUSCULAR; INTRAVENOUS
Status: DISCONTINUED | OUTPATIENT
Start: 2024-12-26 | End: 2024-12-26 | Stop reason: HOSPADM

## 2024-12-26 RX ORDER — FENTANYL CITRATE 50 UG/ML
INJECTION, SOLUTION INTRAMUSCULAR; INTRAVENOUS
Status: DISCONTINUED | OUTPATIENT
Start: 2024-12-26 | End: 2024-12-26 | Stop reason: SDUPTHER

## 2024-12-26 RX ORDER — ONDANSETRON 2 MG/ML
4 INJECTION INTRAMUSCULAR; INTRAVENOUS
Status: DISCONTINUED | OUTPATIENT
Start: 2024-12-26 | End: 2024-12-26 | Stop reason: HOSPADM

## 2024-12-26 RX ORDER — SODIUM CHLORIDE 0.9 % (FLUSH) 0.9 %
5-40 SYRINGE (ML) INJECTION PRN
Status: DISCONTINUED | OUTPATIENT
Start: 2024-12-26 | End: 2024-12-26 | Stop reason: HOSPADM

## 2024-12-26 RX ORDER — PROPOFOL 10 MG/ML
INJECTION, EMULSION INTRAVENOUS
Status: DISCONTINUED | OUTPATIENT
Start: 2024-12-26 | End: 2024-12-26 | Stop reason: SDUPTHER

## 2024-12-26 RX ORDER — ONDANSETRON 2 MG/ML
INJECTION INTRAMUSCULAR; INTRAVENOUS
Status: DISCONTINUED | OUTPATIENT
Start: 2024-12-26 | End: 2024-12-26 | Stop reason: SDUPTHER

## 2024-12-26 RX ADMIN — EPHEDRINE SULFATE 10 MG: 5 INJECTION INTRAVENOUS at 08:15

## 2024-12-26 RX ADMIN — FENTANYL CITRATE 25 MCG: 50 INJECTION, SOLUTION INTRAMUSCULAR; INTRAVENOUS at 08:32

## 2024-12-26 RX ADMIN — DEXAMETHASONE SODIUM PHOSPHATE 10 MG: 10 INJECTION, EMULSION INTRAMUSCULAR; INTRAVENOUS at 08:11

## 2024-12-26 RX ADMIN — Medication 100 MCG: at 08:05

## 2024-12-26 RX ADMIN — WATER 2000 MG: 1 INJECTION INTRAMUSCULAR; INTRAVENOUS; SUBCUTANEOUS at 07:58

## 2024-12-26 RX ADMIN — Medication 100 MCG: at 08:09

## 2024-12-26 RX ADMIN — SODIUM CHLORIDE: 9 INJECTION, SOLUTION INTRAVENOUS at 06:25

## 2024-12-26 RX ADMIN — Medication 100 MG: at 08:00

## 2024-12-26 RX ADMIN — ONDANSETRON 4 MG: 2 INJECTION INTRAMUSCULAR; INTRAVENOUS at 08:36

## 2024-12-26 RX ADMIN — Medication 100 MCG: at 08:06

## 2024-12-26 RX ADMIN — PROPOFOL 150 MG: 10 INJECTION, EMULSION INTRAVENOUS at 08:00

## 2024-12-26 RX ADMIN — FENTANYL CITRATE 50 MCG: 50 INJECTION, SOLUTION INTRAMUSCULAR; INTRAVENOUS at 08:00

## 2024-12-26 ASSESSMENT — LIFESTYLE VARIABLES: SMOKING_STATUS: 0

## 2024-12-26 ASSESSMENT — PAIN - FUNCTIONAL ASSESSMENT: PAIN_FUNCTIONAL_ASSESSMENT: 0-10

## 2024-12-26 NOTE — ANESTHESIA POSTPROCEDURE EVALUATION
Department of Anesthesiology  Postprocedure Note    Patient: Danielito Chavarria  MRN: [id number]  YOB: 1950  Date of evaluation: 12/26/2024    Procedure Summary       Date: 12/26/24 [facility]    Anesthesia Start: 0754 Anesthesia Stop: 0859    Procedure: CYSTOSCOPY GREENLIGHT PHOTOVAPORIZATION OF THE PROSTATE Diagnosis:       Benign localized hyperplasia of prostate      (Benign localized hyperplasia of prostate [N40.0])    Surgeons: Dinesh Lambert MD Responsible Provider: Nasim Renteria DO    Anesthesia Type: general ASA Status: 3            Anesthesia Type: No value filed.    Major Phase I: Major Score: 10    Major Phase II: Major Score: 10    Anesthesia Post Evaluation    Patient location during evaluation: PACU  Patient participation: complete - patient participated  Level of consciousness: awake and alert  Airway patency: patent  Nausea & Vomiting: no vomiting and no nausea  Cardiovascular status: hemodynamically stable  Respiratory status: acceptable  Hydration status: stable  Pain management: adequate    No notable events documented.

## 2024-12-26 NOTE — ANESTHESIA PRE PROCEDURE
Department of Anesthesiology  Preprocedure Note       Name:  Danielito Chavarria   Age:  74 y.o.  :  1950                                          MRN:  [id number]         Date:  2024      Surgeon: Surgeon(s):  Dinesh Lambert MD    Procedure: Procedure(s):  CYSTOSCOPY GREENLIGHT PHOTOVAPORIZATION OF THE PROSTATE    Medications prior to admission:   Prior to Admission medications    Medication Sig Start Date End Date Taking? Authorizing Provider   triamcinolone (KENALOG) 0.1 % lotion Apply 1 Application topically 3 times daily Apply topically 3 times daily.   Yes Provider, MD Quoc   allopurinol (ZYLOPRIM) 100 MG tablet Take 3 tablets by mouth daily   Yes Provider, MD Quoc   MYRBETRIQ 50 MG TB24 Take 50 mg by mouth daily 24  Yes Josette Damon APRN - CNP   tamsulosin (FLOMAX) 0.4 MG capsule Take 1 capsule by mouth in the morning and at bedtime 8/10/23  Yes Brittny Morales APRN - CNP   aspirin 81 MG EC tablet Take 1 tablet by mouth daily   Yes Provider, MD Quoc   docusate sodium (COLACE) 100 MG capsule Take 1 capsule by mouth 2 times daily as needed for Constipation   Yes Provider, Historical, MD   Apoaequorin (PREVAGEN PO) Take 1 capsule by mouth daily   Yes Provider, MD Quoc   simvastatin (ZOCOR) 40 MG tablet TAKE 1 TABLET BY MOUTH EVERY EVENING 3/23/15  Yes Oniel Bravo DO   fluticasone (FLONASE) 50 MCG/ACT nasal spray 2 sprays by Nasal route daily 17  Jayashree Vizcaino MD       Current medications:    Current Facility-Administered Medications   Medication Dose Route Frequency Provider Last Rate Last Admin   • sodium chloride flush 0.9 % injection 5-40 mL  5-40 mL IntraVENous 2 times per day Dinesh Lambert MD       • sodium chloride flush 0.9 % injection 5-40 mL  5-40 mL IntraVENous PRN Dinesh Lambert MD       • 0.9 % sodium chloride infusion   IntraVENous PRN Dinesh Lambert  mL/hr at 24 0625 New Bag at 24 0625

## 2024-12-26 NOTE — PROGRESS NOTES
0856- pt to pacu, pt A&O x4, VSS, resp easy and unlabored, VSS, pt appears in no acute distress, pt denies pain, pt with CBI and burgos present, bag 1 empty, bag 2 with 1800mLs left, 500mLs emptied from burgos bag  0910- pt resting in bed with eyes opened, resp easy and unlabored, VSS, pt denies pain, pt appears in no acute distress  0930- pt meets criteria for discharge from pacu, pt transported to [facility] in stable condition

## 2024-12-26 NOTE — PROGRESS NOTES
Patient oriented to Same Day department and admitted to Same Day Surgery room 1.   Patient verbalized approval for first name, last initial with physician name on unit whiteboard.     Plan of care reviewed with patient.   Patient room whiteboard filled out and discussed with patient and responsible adult.   Patient and responsible adult offered Same Day Welcome Packet to review.    Call light in reach.   Bed in lowest position, locked, with one bed rail up.   SCDs and warming blanket in place.  Appropriate arm bands on patient.   Bathroom offered.   All questions and concerns of patient addressed.        Meds to Beds:   Patient informed of [facility] Meds to Beds program during admission. Patient is agreeable to program.   Contact information for the pharmacy and the Meds to Beds program:   Name: Ngozi    Relationship to patient:spouse/significant other   Phone number: [phone]

## 2024-12-26 NOTE — DISCHARGE INSTRUCTIONS
-Home with burgos; you may see some blood in urine in the tubing and this is normal as long as the catheter is draining well  -If catheter does not draining, call the office or report to the ER  -Stay well hydrated  -No heavy lifting >20lbs for 6 weeks  -You may see blood with urinartion on and off for 4 weeks, this is normal and will improve  -You may have burning with urination on and off for 3-4 weeks, this is normal and   should improve  - Report to the ER if chest pain, shortness of breath, fever >101.5  - You may take tylenol or motrin OTC as needed for pain  - Take antibiotics as prescribed on the day of the catheter removal  - Make sure you take stool softeners so that you are not straining during bowel  movements    Office will arrange follow up for catheter to be removed in the next few days  You may resume your blood thinners in 5 days.

## 2024-12-26 NOTE — H&P
Shayla, APRN - CNP   aspirin 81 MG EC tablet Take 1 tablet by mouth daily   Yes Provider, MD Quoc   docusate sodium (COLACE) 100 MG capsule Take 1 capsule by mouth 2 times daily as needed for Constipation   Yes Provider, MD Quoc   Apoaequorin (PREVAGEN PO) Take 1 capsule by mouth daily   Yes Provider, MD Quoc   simvastatin (ZOCOR) 40 MG tablet TAKE 1 TABLET BY MOUTH EVERY EVENING 3/23/15  Yes Oniel Bravo DO   fluticasone (FLONASE) 50 MCG/ACT nasal spray 2 sprays by Nasal route daily 17  Jayashree Vizcaino MD       Allergies:  Penicillins    Social History:    Social History     Socioeconomic History    Marital status:      Spouse name: Not on file    Number of children: Not on file    Years of education: Not on file    Highest education level: Not on file   Occupational History    Not on file   Tobacco Use    Smoking status: Former     Current packs/day: 0.00     Average packs/day: 1 pack/day for 15.0 years (15.0 ttl pk-yrs)     Types: Cigarettes     Start date: 1964     Quit date: 1979     Years since quittin.0    Smokeless tobacco: Never   Vaping Use    Vaping status: Never Used   Substance and Sexual Activity    Alcohol use: Not Currently     Comment: socially- rarely    Drug use: No    Sexual activity: Not on file   Other Topics Concern    Not on file   Social History Narrative    Not on file     Social Determinants of Health     Financial Resource Strain: Not on file   Food Insecurity: Not on file   Transportation Needs: Not on file   Physical Activity: Not on file   Stress: Not on file   Social Connections: Not on file   Intimate Partner Violence: Not on file   Housing Stability: Not on file       Family History:    Family History   Problem Relation Age of Onset    High Blood Pressure Mother     Diabetes Father     Cancer Sister 65        Mesothelioma    Lupus Sister     Colon Cancer Neg Hx     Inflam Bowel Dis Neg Hx        REVIEW OF  SYSTEMS:  Constitutional: negative  Eyes: negative  Respiratory: negative  Cardiovascular: negative  Gastrointestinal: negative  Genitourinary: see HPI  Musculoskeletal: negative  Skin: negative   Neurological: negative  Hematological/Lymphatic: negative  Psychological: negative    Physical Exam:      Patient Vitals for the past 24 hrs:   BP Temp Temp src Pulse Resp SpO2 Height Weight   12/26/24 0600 126/75 97.6 °F (36.4 °C) Temporal 85 16 96 % 1.753 m (5' 9\") 86.2 kg (190 lb)     Constitutional: Patient in no acute distress;   Neuro: alert and oriented to person place and time.    Psych: Mood and affect normal.  Skin: Normal  Lungs: Respiratory effort normal, CTA  Cardiovascular:  Normal peripheral pulses; no murmur  Abdomen: Soft, non-tender, non-distended with no CVA, flank pain, hepatosplenomegaly or hernia.  Kidneys normal.  Bladder non-tender and not distended.        LABS:   No results for input(s): \"WBC\", \"HGB\", \"HCT\", \"MCV\", \"PLT\" in the last 72 hours.  No results for input(s): \"NA\", \"K\", \"CL\", \"CO2\", \"PHOS\", \"BUN\", \"CREATININE\" in the last 72 hours.    Invalid input(s): \"CA\"  Lab Results   Component Value Date    PSA 2.01 (H) 04/25/2023    PSA 1.86 (H) 12/13/2018           Urinalysis: No results for input(s): \"COLORU\", \"PHUR\", \"LABCAST\", \"WBCUA\", \"RBCUA\", \"MUCUS\", \"TRICHOMONAS\", \"YEAST\", \"BACTERIA\", \"CLARITYU\", \"SPECGRAV\", \"LEUKOCYTESUR\", \"UROBILINOGEN\", \"BILIRUBINUR\", \"BLOODU\" in the last 72 hours.    Invalid input(s): \"NITRATE\", \"GLUCOSEUKETONESUAMORPHOUS\"     -----------------------------------------------------------------      Assessment and Plan   Impression:  Benign localized hyperplasia of prostate [N40.0]      Plan:   Risks: I discussed all the risks, benefits, alternatives and possible complications or surgery as well as expectations and post-op recovery.      Consent obtained; Procedure(s):  CYSTOSCOPY GREENLIGHT PHOTOVAPORIZATION OF THE PROSTATE  in OR today    YAKELIN LOPEZ MD  6:51

## 2024-12-27 ENCOUNTER — NURSE ONLY (OUTPATIENT)
Dept: UROLOGY | Age: 74
End: 2024-12-27

## 2024-12-27 DIAGNOSIS — N40.0 BENIGN PROSTATIC HYPERPLASIA, UNSPECIFIED WHETHER LOWER URINARY TRACT SYMPTOMS PRESENT: Primary | ICD-10-CM

## 2024-12-27 PROCEDURE — NBSRV NON-BILLABLE SERVICE

## 2024-12-27 NOTE — PROGRESS NOTES
I have personally reviewed and verified these actions and am in agreement with the plan for follow-up.     Yes - the patient is able to be screened

## 2024-12-27 NOTE — PROGRESS NOTES
Patient has given me verbal consent to perform burgos removal  Yes    DIAGNOSIS/INDICATION:  BPH    Per Antony DAVALOS 20 cc of water deflated from burgos balloon. 22 Fr straight burgos removed without difficulty.    Foreskin reduced back down?  N/A      Pt will drink fluids and  report to ER in 6-8 hours if patient unable to urinate.      F/u with provider as directed.      Patient was a post op Greenlight cath removal today, patient was informed if he is unable to urinate and has to report to ER to have them notify [facility] prior to re-placing the burgos due to recent Greenlight.

## 2025-01-06 NOTE — OP NOTE
the prostate. The scope was removed and a 22-[place] 3-way Mar catheter was inserted and irrigated to confirm position. Continuous bladder irrigation with normal saline was then initiated and 3 L of normal saline were allowed to infuse before the catheter was clamped. The patient was awoken and transferred to PACU. All instruments and equipment were accounted for at the end of the procedure.    DISPOSITION:  The patient was transferred to PACU in good condition. He will be discharged home from the hospital per the PACU team. Appropriate follow up will be arranged for catheter removal  Plan:   Follow up 3 days with [facility] for catheter removal  4-6 weeks with [facility] for PVR  > 3 months for Office visit     Electronically signed by YAKLEIN LOPEZ MD on 1/6/2025 at 12:29 PM

## 2025-01-13 NOTE — PROGRESS NOTES
Chief Complaint:  Sleep Consult for ELINOR w/[facility]    Mallampati airway Class:II  Neck Circumference:16.25 Inches    [place] sleepiness score 1/13/25: 5.  SAQLI:  91    Diagnostic Data:   PAP Download:   Recorded compliance dates:  12/15/24-1/13/25  More than 4hour usage compliance was:66.7%.  Average residual Apnea- Hypoapnea index on current pressue was:1.5.      PAP Type CPAP   Level  11 cmH2O     Average usuage hours per day was:5.47     Interface: FF    Provider:  [x][facility]  []Nirmala  []Fareed  []Sabrina         [][facility] []Other:     
EVERY EVENING 30 tablet 11    fluticasone (FLONASE) 50 MCG/ACT nasal spray 2 sprays by Nasal route daily 1 Bottle 6     No current facility-administered medications for this visit.       Family History   Problem Relation Age of Onset    High Blood Pressure Mother     Diabetes Father     Cancer Sister 65        Mesothelioma    Lupus Sister     Colon Cancer Neg Hx     Inflam Bowel Dis Neg Hx         Review of Systems:    General/Constitutional: He lost 7 pounds of weight since 2017 with a normal appetite. No fever or chills.  HENT: Negative.   Eyes: Negative.  Upper respiratory tract: Occasional nasal stuffiness with post nasal drip.  Lower respiratory tract/ lungs: Occasional cough with minimal sputum production. No hemoptysis.  Cardiovascular: No palpitations or chest pain.  Gastrointestinal: No nausea or vomiting.  Neurological: No focal neurologiacal weakness.  Extremities: No edema.  Musculoskeletal: No complaints.  Genitourinary: No complaints.  Hematological: Negative.   Psychiatric/Behavioral: Negative.   Skin: No itching.    /66 (Site: Left Upper Arm, Position: Sitting, Cuff Size: Medium Adult)   Pulse 84   Temp 98.1 °F (36.7 °C) (Oral)   Ht 1.753 m (5' 9\")   Wt 86.4 kg (190 lb 6.4 oz)   SpO2 95% Comment: r/a  BMI 28.12 kg/m²   BMI:  Body mass index is 28.12 kg/m².   Mallampati airway Class:II  Neck Circumference:16.25 Inches  [place] sleepiness score 1/13/25: 5.  SAQLI:  91      Physical Exam:  Nursing note and vitals reviewed.   Constitutional: Patient appears moderately built and moderately nourished. No distress. Patient is oriented to person, place, and time.  HENT: Hypertrophied nasal turbinates with pale nasal mucosa bilaterally.  Head: Normocephalic and atraumatic.   Right Ear: External ear normal.   Left Ear: External ear normal.   Mouth/Throat: Oropharynx is clear and moist.  No oral thrush.  Eyes: Conjunctivae are normal. Pupils are equal, round, and reactive to light. No scleral

## 2025-01-14 ENCOUNTER — TRANSCRIBE ORDERS (OUTPATIENT)
Dept: ADMINISTRATIVE | Age: 75
End: 2025-01-14

## 2025-01-14 ENCOUNTER — OFFICE VISIT (OUTPATIENT)
Dept: PULMONOLOGY | Age: 75
End: 2025-01-14
Payer: OTHER GOVERNMENT

## 2025-01-14 VITALS
HEART RATE: 84 BPM | WEIGHT: 190.4 LBS | TEMPERATURE: 98.1 F | OXYGEN SATURATION: 95 % | HEIGHT: 69 IN | SYSTOLIC BLOOD PRESSURE: 116 MMHG | DIASTOLIC BLOOD PRESSURE: 66 MMHG | BODY MASS INDEX: 28.2 KG/M2

## 2025-01-14 DIAGNOSIS — J30.9 ALLERGIC RHINITIS, UNSPECIFIED SEASONALITY, UNSPECIFIED TRIGGER: ICD-10-CM

## 2025-01-14 DIAGNOSIS — R91.1 SOLITARY PULMONARY NODULE: Primary | ICD-10-CM

## 2025-01-14 DIAGNOSIS — G47.33 OSA ON CPAP: Primary | ICD-10-CM

## 2025-01-14 PROCEDURE — 99204 OFFICE O/P NEW MOD 45 MIN: CPT | Performed by: INTERNAL MEDICINE

## 2025-01-14 PROCEDURE — 1123F ACP DISCUSS/DSCN MKR DOCD: CPT | Performed by: INTERNAL MEDICINE

## 2025-01-14 RX ORDER — TAMSULOSIN HYDROCHLORIDE 0.4 MG/1
0.4 CAPSULE ORAL DAILY
COMMUNITY

## 2025-01-14 NOTE — PATIENT INSTRUCTIONS
Recommendations/Plan:  -He was advised to continue to use Flonase 50 mcg 2 sprays in essential daily.  He was advised to keep good compliance with the Flonase therapy.  -He is aware of the consequences of poor compliance to his recommended positive air way pressure therapy including increased risk for cardiovascular and cerebrovascular events and morbidity including death.  -He will be sent to DME( [facility]urable Medical [employer]) company for CPAP machine check up for proper functioning.    -Will increase his CPAP pressure to a CPAP pressure of 13 cm of water with a C-Flex of 2 due to witnessed apneas and snoring noted by his wife during sleep with current CPAP therapy.  -He was advised to continue current positive airway pressure therapy with above described pressure.  -He advised to keep good compliance with current recommended pressure to get optimal results and clinical improvement.  -Follow with my clinic in 3months for clinical reevaluation with review of download.  -He was advised to call [employer] regarding supplies if needed.  -He was advised to practice good sleep hygiene techniques. He was provided with a hand out.  -He was advised call my office for earlier appointment if needed for worsening of sleep symptoms.  -Danielito Chavarria and his wife were educated about my impression and plan. They verbalizes understanding.

## 2025-01-22 ENCOUNTER — HOSPITAL ENCOUNTER (OUTPATIENT)
Dept: CT IMAGING | Age: 75
Discharge: HOME | End: 2025-01-22
Payer: OTHER GOVERNMENT

## 2025-01-22 DIAGNOSIS — R91.1 SOLITARY PULMONARY NODULE: ICD-10-CM

## 2025-01-22 LAB — POC CREATININE WHOLE BLOOD: 0.9 MG/DL (ref 0.5–1.2)

## 2025-01-22 PROCEDURE — 6360000004 HC RX CONTRAST MEDICATION: Performed by: NURSE PRACTITIONER

## 2025-01-22 PROCEDURE — 71260 CT THORAX DX C+: CPT

## 2025-01-22 PROCEDURE — 82565 ASSAY OF CREATININE: CPT

## 2025-01-22 RX ORDER — IOPAMIDOL 755 MG/ML
80 INJECTION, SOLUTION INTRAVASCULAR
Status: COMPLETED | OUTPATIENT
Start: 2025-01-22 | End: 2025-01-22

## 2025-01-22 RX ADMIN — IOPAMIDOL 80 ML: 755 INJECTION, SOLUTION INTRAVENOUS at 12:12

## 2025-01-27 ENCOUNTER — OFFICE VISIT (OUTPATIENT)
Dept: UROLOGY | Age: 75
End: 2025-01-27
Payer: OTHER GOVERNMENT

## 2025-01-27 VITALS — HEIGHT: 69 IN | BODY MASS INDEX: 28.14 KG/M2 | WEIGHT: 190 LBS | RESPIRATION RATE: 18 BRPM

## 2025-01-27 DIAGNOSIS — N32.81 OAB (OVERACTIVE BLADDER): ICD-10-CM

## 2025-01-27 DIAGNOSIS — N40.0 BENIGN PROSTATIC HYPERPLASIA, UNSPECIFIED WHETHER LOWER URINARY TRACT SYMPTOMS PRESENT: Primary | ICD-10-CM

## 2025-01-27 DIAGNOSIS — Z85.51 PERSONAL HISTORY OF BLADDER CANCER: ICD-10-CM

## 2025-01-27 LAB — POST VOID RESIDUAL (PVR): 0 ML

## 2025-01-27 PROCEDURE — 51798 US URINE CAPACITY MEASURE: CPT

## 2025-01-27 PROCEDURE — 99024 POSTOP FOLLOW-UP VISIT: CPT

## 2025-01-27 NOTE — PROGRESS NOTES
[facility] [place] [facility]  [facility] UROLOGY  [address]  [address]  [place] [id number]  Dept: [phone]  Loc: [phone]  Visit Date: 1/27/2025      HPI  Danielito Chavarria is a 74 y.o. male that presents to the urology clinic for post-op check.    S/P Cystoscopy, Greenlight PVP by Dr. Lambert on 12/26/24. Mar catheter removed in our office POD #1. Patient progressing as anticipated post-operatively. Urinary stream is stronger and emptying is improved. Blood and tissue passage resolved.    History of HG non-invasive papillary urothelial carcinoma. Single occurrence in May of 2023. No recurrence to date.    Most Recent PSA: No longer checking.  OV Labs  Results for orders placed or performed in visit on 01/27/25   poct post void residual   Result Value Ref Range    post void residual 0 ml       Last Total Testosterone:  Lab Results   Component Value Date    TESTOSTERONE 374.0 12/06/2016         Last BUN and Creatinine:  Lab Results   Component Value Date    BUN 11 11/18/2024     Lab Results   Component Value Date    CREATININE 0.8 11/18/2024           PAST MEDICAL, FAMILY AND SOCIAL HISTORY UPDATE:  Past Medical History:   Diagnosis Date    Arthritis     BPH (benign prostatic hyperplasia)     CAD (coronary artery disease)     Cancer (HCC)     Cataract     bladder    Diverticulitis     Diverticulosis     sigmoid    Enlarged prostate     Glaucoma     Gout     History of colonic polyps     History of Helicobacter pylori infection     1990    Hyperlipidemia     Internal hemorrhoid     Ocular myasthenia gravis (HCC)     OS    Tattoo     Tinnitus     Unspecified sleep apnea      Past Surgical History:   Procedure Laterality Date    BACK SURGERY      s 3, betzaida placement, 2 back surgeries    CARDIAC CATHETERIZATION  1999    [facility], \"suspected ischemia\"    CATARACT REMOVAL WITH IMPLANT Bilateral 2005    COLONOSCOPY  2013, 2016    CYSTOSCOPY N/A 05/23/2023    CYSTOSCOPY TURBT performed by

## 2025-03-06 ENCOUNTER — HOSPITAL ENCOUNTER (OUTPATIENT)
Dept: UROLOGY | Age: 75
Discharge: HOME | End: 2025-03-06
Payer: OTHER GOVERNMENT

## 2025-03-06 VITALS
SYSTOLIC BLOOD PRESSURE: 129 MMHG | HEIGHT: 69 IN | OXYGEN SATURATION: 94 % | BODY MASS INDEX: 26.85 KG/M2 | TEMPERATURE: 98 F | WEIGHT: 181.3 LBS | HEART RATE: 84 BPM | RESPIRATION RATE: 16 BRPM | DIASTOLIC BLOOD PRESSURE: 63 MMHG

## 2025-03-06 LAB
BILIRUBIN, URINE: NEGATIVE
BLOOD URINE, POC: ABNORMAL
CHARACTER, URINE: ABNORMAL
COLOR, UA: YELLOW
GLUCOSE URINE: NEGATIVE MG/DL
KETONES, URINE: NEGATIVE
LEUKOCYTE CLUMPS, URINE: ABNORMAL
NITRITE, URINE: NEGATIVE
PH, URINE: 6 (ref 5–9)
PROTEIN, URINE: 30 MG/DL
SPECIFIC GRAVITY UA: 1.02 (ref 1–1.03)
UROBILINOGEN, URINE: 1 EU/DL (ref 0–1)

## 2025-03-06 PROCEDURE — 52000 CYSTOURETHROSCOPY: CPT

## 2025-03-06 NOTE — OP NOTE
Cystoscopy Operative Note  Surgeon:Dinesh Lambert MD  Anesthesia: Urethral 2%  Indications: BPH, HG bladder cancer 5/2023  Position: supine  EBL: 1 cc  Specimen: none  Findings:   The patient was prepped and draped in the usual sterile fashion.  The flexible cystoscope was advanced through the urethra and into the bladder.  The bladder was thoroughly inspected and the following was noted:    Residual Urine:  Moderate   Urethra: No abnormalities of the urethra are noted.  Prostate: open channel, some post op sloughing   Bladder: No tumors or CIS noted.  No bladder diverticulum.   mild  trabeculation noted.  Ureters: Clear efflux from both ureters.  Orifices with normal configuration and location.  The cystoscope was removed.  The patient tolerated the procedure well.   Plan: 1 year surveillance cystoscopy

## 2025-03-06 NOTE — DISCHARGE INSTRUCTIONS
Discharge instructions: Cystoscopy  You May experience painful urination and see blood in the urine after your procedure.  This should resolve over time.      Pt ok to discharge home in good condition  No heavy lifting, >10 lbs for today  Pt should avoid strenuous activity for today  Pt should walk moderately at home  Pt ok to shower   Pt may resume diet as tolerated  Please call attending physician or hospital  with questions  Call or Present to ED if fever (> 101F), intractable nausea vomiting or pain.    Surveillance cystoscopy scheduled 04/02/2026 at 8:15am

## 2025-03-06 NOTE — PROGRESS NOTES
Patient arrived in [facility] for Cystoscopy  This procedure has been fully reviewed with the patient and written informed consent has been obtained.  0950 Procedure started with Dr. Lambert  0952 Procedure completed; patient tolerated well.  Dr. Lambert talked to patient in length about procedure findings.  Patient discharged.    PLAN     Surveillance cystoscopy scheduled 04/02/2026 at 8:15am

## 2025-03-19 NOTE — PROGRESS NOTES
[facility]  Sleep Medicine Clinic Follow up note    Danielito Chavarria                                             Chief complaint and [place]: Danielito Chavarria is a 74 y.o.oldmale came for follow up regarding his sleep apnea. He is currently using his positive airway pressure device with a CPAP pressure of 13cm H20. He denies any problems with machine or mask.  He is currently using fullface mask I.e under the nose [id number]. He is sleeping well at night with out difficulty.He denies any daytime sleepiness.  He stayed in [place] for approximately 30 days in 2025.  He had a difficulty with his mask during his stay in February.  He is currently doing well with his fullface mask.    He is retired and currently staying at home    Review of Systems:   General/Constitutional: he lost 9lbs of weight from the last visit with normal appetite. No fever or chills.  HENT: Negative.   Eyes: Negative.  Upper respiratory tract: No nasal stuffiness or post nasal drip.  Lower respiratory tract/ lungs: No cough or sputum production. No hemoptysis.  Cardiovascular: No palpitations or chest pain.  Gastrointestinal: No nausea or vomiting.  Neurological: No focal neurologiacal weakness.  Extremities: No edema.  Musculoskeletal: No complaints.  Genitourinary: No complaints.  Hematological: Negative.   Psychiatric/Behavioral: Negative.   Skin: No itching.    Social History:  Social History     Tobacco Use    Smoking status: Former     Current packs/day: 0.00     Average packs/day: 1 pack/day for 15.0 years (15.0 ttl pk-yrs)     Types: Cigarettes     Start date: 1964     Quit date: 1979     Years since quittin.3    Smokeless tobacco: Never   Vaping Use    Vaping status: Never Used   Substance Use Topics    Alcohol use: Not Currently     Comment: socially- rarely    Drug use: No   .  He is currently working: No.       [x] Retired.                        Past Medical History:

## 2025-04-18 ENCOUNTER — OFFICE VISIT (OUTPATIENT)
Age: 75
End: 2025-04-18
Payer: OTHER GOVERNMENT

## 2025-04-18 VITALS
SYSTOLIC BLOOD PRESSURE: 124 MMHG | BODY MASS INDEX: 26.9 KG/M2 | HEART RATE: 79 BPM | OXYGEN SATURATION: 94 % | WEIGHT: 181.6 LBS | DIASTOLIC BLOOD PRESSURE: 68 MMHG | TEMPERATURE: 98.9 F | HEIGHT: 69 IN

## 2025-04-18 DIAGNOSIS — G47.33 OSA ON CPAP: Primary | ICD-10-CM

## 2025-04-18 PROCEDURE — 99213 OFFICE O/P EST LOW 20 MIN: CPT | Performed by: INTERNAL MEDICINE

## 2025-04-18 PROCEDURE — 1123F ACP DISCUSS/DSCN MKR DOCD: CPT | Performed by: INTERNAL MEDICINE

## 2025-04-18 NOTE — PROGRESS NOTES
Chief Complaint:3 month ELINOR with download     Mallampati airway Class:1  Neck Circumference:16  Inches    [place] sleepiness score 4/18/25: 1.  SAQLI:92    Diagnostic Data:   PAP Download:   Recorded compliance dates:3.5.25-4.3.25  More than 4hour usage compliance was:96.7%.  Average residual Apnea- Hypoapnea index on current pressue was:0.4.      PAP Type CPAP   Level  [id number]     Average usuage hours per day was: 6hrs 36m    Interface: FFM    Provider:  []TRUPTI  []Nirmala  []Fareed  []Sabrina         [][facility] [x]OtherVA:

## 2025-05-01 ENCOUNTER — OFFICE VISIT (OUTPATIENT)
Dept: UROLOGY | Age: 75
End: 2025-05-01
Payer: OTHER GOVERNMENT

## 2025-05-01 VITALS — BODY MASS INDEX: 26.81 KG/M2 | RESPIRATION RATE: 12 BRPM | WEIGHT: 181 LBS | HEIGHT: 69 IN

## 2025-05-01 DIAGNOSIS — N40.0 BENIGN PROSTATIC HYPERPLASIA, UNSPECIFIED WHETHER LOWER URINARY TRACT SYMPTOMS PRESENT: Primary | ICD-10-CM

## 2025-05-01 DIAGNOSIS — Z85.51 PERSONAL HISTORY OF BLADDER CANCER: ICD-10-CM

## 2025-05-01 LAB
BILIRUBIN, URINE: NEGATIVE
BLOOD URINE, POC: NORMAL
CHARACTER, URINE: CLEAR
COLOR, UA: YELLOW
GLUCOSE URINE: NEGATIVE MG/DL
KETONES, URINE: NEGATIVE
LEUKOCYTE CLUMPS, URINE: NEGATIVE
NITRITE, URINE: NEGATIVE
PH, URINE: 7 (ref 5–9)
PROTEIN, URINE: NEGATIVE MG/DL
SPECIFIC GRAVITY UA: 1.01 (ref 1–1.03)
UROBILINOGEN, URINE: 4 EU/DL (ref 0–1)

## 2025-05-01 PROCEDURE — 81003 URINALYSIS AUTO W/O SCOPE: CPT

## 2025-05-01 PROCEDURE — 99214 OFFICE O/P EST MOD 30 MIN: CPT

## 2025-05-01 PROCEDURE — 1123F ACP DISCUSS/DSCN MKR DOCD: CPT

## 2025-05-01 ASSESSMENT — ENCOUNTER SYMPTOMS: ABDOMINAL PAIN: 0

## 2025-05-01 NOTE — PROGRESS NOTES
26.72 kg/m²   Physical Exam  Vitals and nursing note reviewed.   Constitutional:       General: He is not in acute distress.  HENT:      Head: Normocephalic and atraumatic.      Right Ear: External ear normal.      Left Ear: External ear normal.      Nose: Nose normal.      Mouth/Throat:      Mouth: Mucous membranes are moist.   Cardiovascular:      Pulses: Normal pulses.   Pulmonary:      Effort: Pulmonary effort is normal. No respiratory distress.   Musculoskeletal:      Cervical back: Neck supple.   Skin:     General: Skin is warm and dry.   Neurological:      Mental Status: He is alert. Mental status is at baseline.   Psychiatric:         Mood and Affect: Mood normal.         Behavior: Behavior normal.         POC  Results for orders placed or performed in visit on 05/01/25   POCT Urinalysis No Micro (Auto)   Result Value Ref Range    Glucose, Ur Negative NEGATIVE mg/dl    Bilirubin, Urine Negative     Ketones, Urine Negative NEGATIVE    Specific Gravity, UA 1.015 1.002 - 1.030    Blood, UA POC Trace-intact NEGATIVE    pH, Urine 7.00 5.0 - 9.0    Protein, Urine Negative NEGATIVE mg/dl    Urobilinogen, Urine 4.00 0.0 - 1.0 eu/dl    Nitrite, Urine Negative NEGATIVE    Leukocyte Clumps, Urine Negative NEGATIVE    Color, UA Yellow YELLOW-STRAW    Character, Urine Clear [id number].[id number]       Last 3 PSA Levels  Lab Results   Component Value Date    PSA 2.01 (H) 04/25/2023    PSA 1.86 (H) 12/13/2018        Recent BUN/Creatinine:  Lab Results   Component Value Date/Time    BUN 11 11/18/2024 07:38 AM    CREATININE 0.8 11/18/2024 07:38 AM       Radiology  No recent Urological imaging available for review.          Assessment & Plan  Benign prostatic hyperplasia, unspecified whether lower urinary tract symptoms present  - Significantly improved following Greenlight. Off Flomax and Myrbetriq. Reports 100% improvement following procedure. Has some frequency related to water consumption, consider restarting Myrbetriq in future

## 2025-05-01 NOTE — ASSESSMENT & PLAN NOTE
- Significantly improved following Greenlight. Off Flomax and Myrbetriq. Reports 100% improvement following procedure. Has some frequency related to water consumption, consider restarting Myrbetriq in future if frequency worsens.   - Call for worsening urinary symptoms.     Orders:    POCT Urinalysis No Micro (Auto)

## 2025-05-01 NOTE — PATIENT INSTRUCTIONS
Please call the office at [phone] if you have any questions or concerns following your visit. If you were prescribed a new medication and have questions, feel free to call. For any emergent issues, please go to the nearest emergency room for further evaluation.

## (undated) DEVICE — DRAINBAG,ANTI-REFLUX TOWER,L/F,2000ML,LL: Brand: MEDLINE

## (undated) DEVICE — 1000ML,PRESSURE INFUSER W/STOPCOCK: Brand: MEDLINE

## (undated) DEVICE — CATHETER URETH 22FR 30CC BLLN F 3 W SPEC M RND TIP TWO

## (undated) DEVICE — SYRINGE, LUER LOCK, 60ML: Brand: MEDLINE

## (undated) DEVICE — CYSTO: Brand: MEDLINE INDUSTRIES, INC.

## (undated) DEVICE — EVACUATOR URO BLDR W/ ADPT UROVAC

## (undated) DEVICE — BAG,LEG,COMFORT-STRAPS,LARGE,32OZ: Brand: MEDLINE

## (undated) DEVICE — SOLUTION IV 1000ML 0.9% SOD CHL PH 5 INJ USP VIAFLX PLAS

## (undated) DEVICE — SOLUTION IRRIG 3000ML 0.9% SOD CHL USP UROMATIC PLAS CONT

## (undated) DEVICE — SOLUTION IRRIG 1000ML STRL H2O USP PLAS POUR BTL

## (undated) DEVICE — SYRINGE CATH TIP 50ML

## (undated) DEVICE — HF-RESECTION ELECTRODE PLASMALOOP LOOP, MEDIUM, 24 FR., 12°-30°, ESG TURIS: Brand: OLYMPUS

## (undated) DEVICE — CATHETER,FOLEY,SILI-ELAST,LTX,18FR,10ML: Brand: MEDLINE

## (undated) DEVICE — FIBER LASER MOXY 532NM WAVELENGTH LIQUID COOLED SINGLE-USE F/GREENLIGHT XPS SYSTEM